# Patient Record
Sex: FEMALE | Race: ASIAN | Employment: UNEMPLOYED | URBAN - METROPOLITAN AREA
[De-identification: names, ages, dates, MRNs, and addresses within clinical notes are randomized per-mention and may not be internally consistent; named-entity substitution may affect disease eponyms.]

---

## 2018-09-26 LAB
ABO, EXTERNAL RESULT: NORMAL
HEP B, EXTERNAL RESULT: NORMAL
HIV, EXTERNAL RESULT: NORMAL
RHOGAM, EXTERNAL RESULT: POSITIVE
RPR, EXTERNAL RESULT: NORMAL
RUBELLA TITER, EXTERNAL RESULT: NORMAL

## 2018-11-18 ENCOUNTER — HOSPITAL ENCOUNTER (EMERGENCY)
Age: 23
Discharge: HOME | End: 2018-11-18
Attending: EMERGENCY MEDICINE
Payer: MEDICAID

## 2018-11-18 ENCOUNTER — APPOINTMENT (OUTPATIENT)
Dept: ULTRASOUND IMAGING | Age: 23
End: 2018-11-18
Payer: MEDICAID

## 2018-11-18 VITALS
HEIGHT: 66 IN | RESPIRATION RATE: 14 BRPM | BODY MASS INDEX: 21.86 KG/M2 | SYSTOLIC BLOOD PRESSURE: 105 MMHG | TEMPERATURE: 97.6 F | WEIGHT: 136 LBS | DIASTOLIC BLOOD PRESSURE: 67 MMHG | HEART RATE: 82 BPM | OXYGEN SATURATION: 100 %

## 2018-11-18 DIAGNOSIS — O44.40 LOW-LYING PLACENTA: ICD-10-CM

## 2018-11-18 DIAGNOSIS — N30.00 ACUTE CYSTITIS WITHOUT HEMATURIA: ICD-10-CM

## 2018-11-18 DIAGNOSIS — R10.9 ABDOMINAL PAIN DURING PREGNANCY, SECOND TRIMESTER: Primary | ICD-10-CM

## 2018-11-18 DIAGNOSIS — O26.892 ABDOMINAL PAIN DURING PREGNANCY, SECOND TRIMESTER: Primary | ICD-10-CM

## 2018-11-18 LAB
ANION GAP SERPL CALCULATED.3IONS-SCNC: 10 MMOL/L (ref 3–16)
BACTERIA: ABNORMAL /HPF
BASOPHILS ABSOLUTE: 0 K/UL (ref 0–0.2)
BASOPHILS RELATIVE PERCENT: 0.3 %
BILIRUBIN URINE: NEGATIVE
BLOOD, URINE: NEGATIVE
BUN BLDV-MCNC: 4 MG/DL (ref 7–20)
CALCIUM SERPL-MCNC: 8.9 MG/DL (ref 8.3–10.6)
CHLORIDE BLD-SCNC: 104 MMOL/L (ref 99–110)
CLARITY: ABNORMAL
CO2: 22 MMOL/L (ref 21–32)
COLOR: YELLOW
COMMENT UA: ABNORMAL
CREAT SERPL-MCNC: <0.5 MG/DL (ref 0.6–1.1)
EOSINOPHILS ABSOLUTE: 0.1 K/UL (ref 0–0.6)
EOSINOPHILS RELATIVE PERCENT: 1.7 %
EPITHELIAL CELLS, UA: 8 /HPF (ref 0–5)
GFR AFRICAN AMERICAN: >60
GFR NON-AFRICAN AMERICAN: >60
GLUCOSE BLD-MCNC: 93 MG/DL (ref 70–99)
GLUCOSE URINE: NEGATIVE MG/DL
GONADOTROPIN, CHORIONIC (HCG) QUANT: NORMAL MIU/ML
HCT VFR BLD CALC: 34.8 % (ref 36–48)
HEMOGLOBIN: 11.3 G/DL (ref 12–16)
HYALINE CASTS: 15 /LPF (ref 0–8)
KETONES, URINE: NEGATIVE MG/DL
LEUKOCYTE ESTERASE, URINE: ABNORMAL
LYMPHOCYTES ABSOLUTE: 1.5 K/UL (ref 1–5.1)
LYMPHOCYTES RELATIVE PERCENT: 20.8 %
MCH RBC QN AUTO: 26 PG (ref 26–34)
MCHC RBC AUTO-ENTMCNC: 32.4 G/DL (ref 31–36)
MCV RBC AUTO: 80.3 FL (ref 80–100)
MICROSCOPIC EXAMINATION: YES
MONOCYTES ABSOLUTE: 0.4 K/UL (ref 0–1.3)
MONOCYTES RELATIVE PERCENT: 5.7 %
NEUTROPHILS ABSOLUTE: 5.3 K/UL (ref 1.7–7.7)
NEUTROPHILS RELATIVE PERCENT: 71.5 %
NITRITE, URINE: NEGATIVE
PDW BLD-RTO: 16.4 % (ref 12.4–15.4)
PH UA: 5
PLATELET # BLD: 236 K/UL (ref 135–450)
PMV BLD AUTO: 8.4 FL (ref 5–10.5)
POTASSIUM SERPL-SCNC: 4 MMOL/L (ref 3.5–5.1)
PROTEIN UA: NEGATIVE MG/DL
RBC # BLD: 4.33 M/UL (ref 4–5.2)
RBC UA: 9 /HPF (ref 0–4)
SODIUM BLD-SCNC: 136 MMOL/L (ref 136–145)
SPECIFIC GRAVITY UA: 1.02
URINE REFLEX TO CULTURE: YES
URINE TYPE: ABNORMAL
UROBILINOGEN, URINE: 0.2 E.U./DL
WBC # BLD: 7.4 K/UL (ref 4–11)
WBC UA: 10 /HPF (ref 0–5)

## 2018-11-18 PROCEDURE — 81001 URINALYSIS AUTO W/SCOPE: CPT

## 2018-11-18 PROCEDURE — 87086 URINE CULTURE/COLONY COUNT: CPT

## 2018-11-18 PROCEDURE — 76801 OB US < 14 WKS SINGLE FETUS: CPT

## 2018-11-18 PROCEDURE — 6370000000 HC RX 637 (ALT 250 FOR IP): Performed by: EMERGENCY MEDICINE

## 2018-11-18 PROCEDURE — 99284 EMERGENCY DEPT VISIT MOD MDM: CPT

## 2018-11-18 PROCEDURE — 85025 COMPLETE CBC W/AUTO DIFF WBC: CPT

## 2018-11-18 PROCEDURE — 80048 BASIC METABOLIC PNL TOTAL CA: CPT

## 2018-11-18 PROCEDURE — 84702 CHORIONIC GONADOTROPIN TEST: CPT

## 2018-11-18 RX ORDER — ACETAMINOPHEN 500 MG
1000 TABLET ORAL ONCE
Status: COMPLETED | OUTPATIENT
Start: 2018-11-18 | End: 2018-11-18

## 2018-11-18 RX ORDER — NITROFURANTOIN 25; 75 MG/1; MG/1
100 CAPSULE ORAL 2 TIMES DAILY
Qty: 14 CAPSULE | Refills: 0 | Status: SHIPPED | OUTPATIENT
Start: 2018-11-18 | End: 2018-11-25

## 2018-11-18 RX ADMIN — ACETAMINOPHEN 1000 MG: 500 TABLET, FILM COATED ORAL at 13:48

## 2018-11-18 ASSESSMENT — PAIN DESCRIPTION - PAIN TYPE: TYPE: ACUTE PAIN

## 2018-11-18 ASSESSMENT — PAIN DESCRIPTION - LOCATION: LOCATION: ABDOMEN

## 2018-11-18 ASSESSMENT — PAIN SCALES - GENERAL: PAINLEVEL_OUTOF10: 5

## 2018-11-18 NOTE — ED PROVIDER NOTES
[facility] Emergency Department    CHIEF COMPLAINT  Chief Complaint   Patient presents with    Abdominal Pain     Pt  - states she is 14 weeks preganant - started having sharp abd pain 3 days ago - first intermittant, now constant as of today, no bleeding, points to low pelvic area      HISTORY OF PRESENT ILLNESS  Suzi Mcgee is a 23 y.o. female  who presents to the ED complaining of  at 14wk4d by dates and known IUP following with Dr. Jean Rojo. Has had several days of LLQ abd pain going to RLQ periodically. No VB or dysuria or vaginal discharge. Pain is not in the upper abd. It is colicky. NO vomiting or diarrhea. No back pain. Otherwise uncomplicated prenatal course so far. No CP cough or SOB. No other complaints, modifying factors or associated symptoms. I have reviewed the following from the nursing documentation. History reviewed. No pertinent past medical history. History reviewed. No pertinent surgical history. History reviewed. No pertinent family history. Social History     Social History    Marital status: Single     Spouse name: N/A    Number of children: N/A    Years of education: N/A     Occupational History    Not on file. Social History Main Topics    Smoking status: Never Smoker    Smokeless tobacco: Never Used    Alcohol use No    Drug use: No    Sexual activity: Not on file     Other Topics Concern    Not on file     Social History Narrative    No narrative on file     No current facility-administered medications for this encounter. Current Outpatient Prescriptions   Medication Sig Dispense Refill    nitrofurantoin, macrocrystal-monohydrate, (MACROBID) 100 MG capsule Take 1 capsule by mouth 2 times daily for 7 days 14 capsule 0     No Known Allergies    REVIEW OF SYSTEMS  10 systems reviewed, pertinent positives per HPI otherwise noted to be negative.     PHYSICAL EXAM  /66   Pulse 121   Temp 97.4 °F (36.3 °C) (Oral) Resp 18   Ht 5' 6\" (1.676 m)   Wt 136 lb (61.7 kg)   SpO2 100%   BMI 21.95 kg/m²    GENERAL APPEARANCE: Awake and alert. Cooperative. No distress. HENT: Normocephalic. Atraumatic. Mucous membranes are dry. NECK: Supple. EYES: PERRL. EOM's grossly intact. HEART/CHEST: RRR. No murmurs. No chest wall tenderness. LUNGS: Respirations unlabored. CTAB. Good air exchange. Speaking comfortably in full sentences. ABDOMEN: Mild suprapubic ttp and LLQ ttp without RLQ or any upper abdominal tenderness. Soft. Non-distended. No masses. No organomegaly. No guarding or rebound. Normal bowel sounds throughout. MUSCULOSKELETAL: No extremity edema. Compartments soft. No deformity. No tenderness in the extremities. All extremities neurovascularly intact. SKIN: Warm and dry. No acute rashes. NEUROLOGICAL: Alert and oriented. CN's 2-12 intact. No gross facial drooping. Strength 5/5, sensation intact. 2 plus DTR's in knees bilaterally. Gait normal.  PSYCHIATRIC: Normal mood and affect. LABS  I have reviewed all labs for this visit.    Results for orders placed or performed during the hospital encounter of 11/18/18   Urine, reflex to culture   Result Value Ref Range    Color, UA YELLOW Straw/Yellow    Clarity, UA CLOUDY (A) Clear    Glucose, Ur Negative Negative mg/dL    Bilirubin Urine Negative Negative    Ketones, Urine Negative Negative mg/dL    Specific Gravity, UA 1.020 1.005 - 1.030    Blood, Urine Negative Negative    pH, UA 5.0 5.0 - 8.0    Protein, UA Negative Negative mg/dL    Urobilinogen, Urine 0.2 <2.0 E.U./dL    Nitrite, Urine Negative Negative    Leukocyte Esterase, Urine SMALL (A) Negative    Microscopic Examination YES     Urine Reflex to Culture Yes     Urine Type Not Specified    CBC auto differential   Result Value Ref Range    WBC 7.4 4.0 - 11.0 K/uL    RBC 4.33 4.00 - 5.20 M/uL    Hemoglobin 11.3 (L) 12.0 - 16.0 g/dL    Hematocrit 34.8 (L) 36.0 - 48.0 %    MCV 80.3 80.0 - 100.0 fL    MCH 26.0

## 2018-11-19 LAB — URINE CULTURE, ROUTINE: NORMAL

## 2019-01-30 LAB
GLUCOSE CHALLENGE: 87 MG/DL
HCT VFR BLD CALC: 32 % (ref 36–48)
HEMOGLOBIN: 10.5 G/DL (ref 12–16)
MCH RBC QN AUTO: 27.4 PG (ref 26–34)
MCHC RBC AUTO-ENTMCNC: 32.8 G/DL (ref 31–36)
MCV RBC AUTO: 83.5 FL (ref 80–100)
PDW BLD-RTO: 15.1 % (ref 12.4–15.4)
PLATELET # BLD: 235 K/UL (ref 135–450)
PMV BLD AUTO: 8.9 FL (ref 5–10.5)
RBC # BLD: 3.83 M/UL (ref 4–5.2)
WBC # BLD: 10.5 K/UL (ref 4–11)

## 2019-03-26 ENCOUNTER — HOSPITAL ENCOUNTER (OUTPATIENT)
Age: 24
Discharge: HOME | End: 2019-03-26
Attending: OBSTETRICS & GYNECOLOGY | Admitting: OBSTETRICS & GYNECOLOGY
Payer: MEDICAID

## 2019-03-26 VITALS
RESPIRATION RATE: 18 BRPM | HEART RATE: 100 BPM | SYSTOLIC BLOOD PRESSURE: 107 MMHG | DIASTOLIC BLOOD PRESSURE: 55 MMHG | WEIGHT: 150 LBS | BODY MASS INDEX: 24.11 KG/M2 | HEIGHT: 66 IN

## 2019-03-26 LAB
BILIRUBIN URINE: NEGATIVE
BLOOD, URINE: NEGATIVE
CLARITY: CLEAR
COLOR: YELLOW
GLUCOSE URINE: NEGATIVE MG/DL
KETONES, URINE: NEGATIVE MG/DL
LEUKOCYTE ESTERASE, URINE: NEGATIVE
MICROSCOPIC EXAMINATION: NORMAL
NITRITE, URINE: NEGATIVE
PH UA: 7 (ref 5–8)
PROTEIN UA: NEGATIVE MG/DL
SPECIFIC GRAVITY UA: 1.01 (ref 1–1.03)
URINE TYPE: NORMAL
UROBILINOGEN, URINE: 0.2 E.U./DL

## 2019-03-26 PROCEDURE — 99211 OFF/OP EST MAY X REQ PHY/QHP: CPT

## 2019-03-26 PROCEDURE — 59025 FETAL NON-STRESS TEST: CPT

## 2019-03-26 PROCEDURE — 81003 URINALYSIS AUTO W/O SCOPE: CPT

## 2019-05-04 ENCOUNTER — HOSPITAL ENCOUNTER (OUTPATIENT)
Age: 24
Discharge: HOME | End: 2019-05-04
Attending: OBSTETRICS & GYNECOLOGY | Admitting: OBSTETRICS & GYNECOLOGY
Payer: MEDICAID

## 2019-05-04 VITALS
BODY MASS INDEX: 25.02 KG/M2 | SYSTOLIC BLOOD PRESSURE: 111 MMHG | TEMPERATURE: 96.2 F | DIASTOLIC BLOOD PRESSURE: 70 MMHG | WEIGHT: 155 LBS | HEART RATE: 76 BPM | RESPIRATION RATE: 16 BRPM

## 2019-05-04 PROCEDURE — 99211 OFF/OP EST MAY X REQ PHY/QHP: CPT

## 2019-05-04 PROCEDURE — 59025 FETAL NON-STRESS TEST: CPT

## 2019-05-04 RX ORDER — FERROUS SULFATE 325(65) MG
325 TABLET ORAL
Status: ON HOLD | COMMUNITY
End: 2019-05-13

## 2019-05-05 NOTE — PROCEDURES
FETAL SURVEILLANCE TESTING SUMMARY    INDICATIONS:  rule out uterine contractions    OBJECTIVE RESULTS:  Fetal heart variability: moderate  Fetal Heart Rate accelerations: yes  Baseline FHR: 130's per minute  Fetal Non-stress Test: reactive  Uterine contractions: regular, every 5 minutes    Fetal surveillance: reassuring

## 2019-05-05 NOTE — PROGRESS NOTES
Department of Obstetrics and Gynecology  Labor and Delivery Triage Note        CHIEF COMPLAINT:  contractions    HISTORY OF PRESENT ILLNESS:      The patient is a 20 y.o. female 44w7d. OB History        1    Para        Term                AB        Living           SAB        TAB        Ectopic        Molar        Multiple        Live Births                  Patient presents with a chief complaint as above. Estimated Due Date:  Estimated Date of Delivery: 19    PAST MEDICAL HISTORY: History reviewed. No pertinent past medical history. PAST  SURGICAL HISTORY: History reviewed. No pertinent surgical history. SOCIAL HISTORY:     reports that she has never smoked. She has never used smokeless tobacco. She reports that she does not drink alcohol or use drugs. MEDICATIONS:    Prior to Admission medications    Medication Sig Start Date End Date Taking? Authorizing Provider   ferrous sulfate 325 (65 Fe) MG tablet Take 325 mg by mouth daily (with breakfast)   Yes Historical Provider, MD   Prenatal Multivit-Min-Fe-FA (PRENATAL 1 + IRON PO) Take by mouth   Yes Historical Provider, MD        PRENATAL CARE:    Complicated by: none    REVIEW OF SYSTEMS:     Pertinent items are noted in HPI. PHYSICAL EXAM:    Vital Signs: Blood pressure 111/70, pulse 76, temperature 96.2 °F (35.7 °C), temperature source Temporal, resp. rate 16, weight 155 lb (70.3 kg). Abdomen soft, non tender, consistent with her EGA. Fetal heart rate:  Baseline Heart Rate 130's, accelerations:  present  long term variability:  moderate  Cervix:  Closed Long firm     Contraction frequency:  5 minutes    Membranes:  Intact    General Labs:      Nond3    ASSESSMENT:    38w5d. There is no problem list on file for this patient.          PLAN:  Disposition:  Patient discharged home and instructed on symptoms of labor, rupture of membranes, vaginal bleeding, fetal movement and fever  F/U Instructions: as

## 2019-05-12 ENCOUNTER — HOSPITAL ENCOUNTER (INPATIENT)
Age: 24
LOS: 8 days | Discharge: HOME | DRG: 560 | End: 2019-05-21
Attending: OBSTETRICS & GYNECOLOGY | Admitting: OBSTETRICS & GYNECOLOGY
Payer: MEDICAID

## 2019-05-12 PROBLEM — O47.9 THREATENED LABOR: Status: ACTIVE | Noted: 2019-05-12

## 2019-05-13 ENCOUNTER — ANESTHESIA EVENT (OUTPATIENT)
Dept: LABOR AND DELIVERY | Age: 24
DRG: 560 | End: 2019-05-13
Payer: MEDICAID

## 2019-05-13 ENCOUNTER — ANESTHESIA (OUTPATIENT)
Dept: LABOR AND DELIVERY | Age: 24
DRG: 560 | End: 2019-05-13
Payer: MEDICAID

## 2019-05-13 PROBLEM — O47.9 THREATENED LABOR AT TERM: Status: ACTIVE | Noted: 2019-05-13

## 2019-05-13 LAB
AMPHETAMINE SCREEN, URINE: NORMAL
BARBITURATE SCREEN URINE: NORMAL
BENZODIAZEPINE SCREEN, URINE: NORMAL
BUPRENORPHINE URINE: NORMAL
CANNABINOID SCREEN URINE: NORMAL
COCAINE METABOLITE SCREEN URINE: NORMAL
HCT VFR BLD CALC: 35.5 % (ref 36–48)
HEMOGLOBIN: 11.6 G/DL (ref 12–16)
Lab: NORMAL
MCH RBC QN AUTO: 26.8 PG (ref 26–34)
MCHC RBC AUTO-ENTMCNC: 32.6 G/DL (ref 31–36)
MCV RBC AUTO: 82.1 FL (ref 80–100)
METHADONE SCREEN, URINE: NORMAL
OPIATE SCREEN URINE: NORMAL
OXYCODONE URINE: NORMAL
PDW BLD-RTO: 14.9 % (ref 12.4–15.4)
PH UA: 5.5
PHENCYCLIDINE SCREEN URINE: NORMAL
PLATELET # BLD: 229 K/UL (ref 135–450)
PMV BLD AUTO: 8.8 FL (ref 5–10.5)
PROPOXYPHENE SCREEN: NORMAL
RBC # BLD: 4.33 M/UL (ref 4–5.2)
SPECIMEN STATUS: NORMAL
TOTAL SYPHILLIS IGG/IGM: NORMAL
WBC # BLD: 9.9 K/UL (ref 4–11)

## 2019-05-13 PROCEDURE — 51702 INSERT TEMP BLADDER CATH: CPT

## 2019-05-13 PROCEDURE — 80307 DRUG TEST PRSMV CHEM ANLYZR: CPT

## 2019-05-13 PROCEDURE — 85027 COMPLETE CBC AUTOMATED: CPT

## 2019-05-13 PROCEDURE — 6360000002 HC RX W HCPCS: Performed by: OBSTETRICS & GYNECOLOGY

## 2019-05-13 PROCEDURE — 86780 TREPONEMA PALLIDUM: CPT

## 2019-05-13 PROCEDURE — 1220000000 HC SEMI PRIVATE OB R&B

## 2019-05-13 PROCEDURE — 2500000003 HC RX 250 WO HCPCS: Performed by: NURSE ANESTHETIST, CERTIFIED REGISTERED

## 2019-05-13 PROCEDURE — 2580000003 HC RX 258: Performed by: OBSTETRICS & GYNECOLOGY

## 2019-05-13 PROCEDURE — 3700000025 EPIDURAL BLOCK: Performed by: ANESTHESIOLOGY

## 2019-05-13 RX ORDER — SODIUM CHLORIDE, SODIUM LACTATE, POTASSIUM CHLORIDE, CALCIUM CHLORIDE 600; 310; 30; 20 MG/100ML; MG/100ML; MG/100ML; MG/100ML
INJECTION, SOLUTION INTRAVENOUS CONTINUOUS
Status: DISCONTINUED | OUTPATIENT
Start: 2019-05-13 | End: 2019-05-14

## 2019-05-13 RX ORDER — ONDANSETRON 2 MG/ML
4 INJECTION INTRAMUSCULAR; INTRAVENOUS EVERY 6 HOURS PRN
Status: DISCONTINUED | OUTPATIENT
Start: 2019-05-13 | End: 2019-05-14

## 2019-05-13 RX ORDER — LIDOCAINE HYDROCHLORIDE 15 MG/ML
INJECTION, SOLUTION EPIDURAL; INFILTRATION; INTRACAUDAL; PERINEURAL PRN
Status: DISCONTINUED | OUTPATIENT
Start: 2019-05-13 | End: 2019-05-14 | Stop reason: SDUPTHER

## 2019-05-13 RX ORDER — IRON POLYSACCHARIDE COMPLEX 180 MG
180 CAPSULE ORAL DAILY
Refills: 4 | Status: ON HOLD | COMMUNITY
Start: 2019-05-07 | End: 2019-05-14 | Stop reason: HOSPADM

## 2019-05-13 RX ORDER — DOCUSATE SODIUM 100 MG/1
100 CAPSULE, LIQUID FILLED ORAL 2 TIMES DAILY
Status: DISCONTINUED | OUTPATIENT
Start: 2019-05-13 | End: 2019-05-14

## 2019-05-13 RX ORDER — BUTORPHANOL TARTRATE 1 MG/ML
1 INJECTION, SOLUTION INTRAMUSCULAR; INTRAVENOUS ONCE
Status: DISCONTINUED | OUTPATIENT
Start: 2019-05-13 | End: 2019-05-13

## 2019-05-13 RX ORDER — BUTORPHANOL TARTRATE 1 MG/ML
1 INJECTION, SOLUTION INTRAMUSCULAR; INTRAVENOUS
Status: COMPLETED | OUTPATIENT
Start: 2019-05-13 | End: 2019-05-13

## 2019-05-13 RX ORDER — ACETAMINOPHEN 325 MG/1
650 TABLET ORAL EVERY 4 HOURS PRN
Status: DISCONTINUED | OUTPATIENT
Start: 2019-05-13 | End: 2019-05-14

## 2019-05-13 RX ADMIN — SODIUM CHLORIDE, POTASSIUM CHLORIDE, SODIUM LACTATE AND CALCIUM CHLORIDE: 600; 310; 30; 20 INJECTION, SOLUTION INTRAVENOUS at 07:26

## 2019-05-13 RX ADMIN — BUTORPHANOL TARTRATE 1 MG: 1 INJECTION, SOLUTION INTRAMUSCULAR; INTRAVENOUS at 14:41

## 2019-05-13 RX ADMIN — SODIUM CHLORIDE, POTASSIUM CHLORIDE, SODIUM LACTATE AND CALCIUM CHLORIDE: 600; 310; 30; 20 INJECTION, SOLUTION INTRAVENOUS at 02:33

## 2019-05-13 RX ADMIN — SODIUM CHLORIDE, POTASSIUM CHLORIDE, SODIUM LACTATE AND CALCIUM CHLORIDE: 600; 310; 30; 20 INJECTION, SOLUTION INTRAVENOUS at 18:08

## 2019-05-13 RX ADMIN — Medication 1 MILLI-UNITS/MIN: at 15:01

## 2019-05-13 RX ADMIN — SODIUM CHLORIDE, POTASSIUM CHLORIDE, SODIUM LACTATE AND CALCIUM CHLORIDE: 600; 310; 30; 20 INJECTION, SOLUTION INTRAVENOUS at 23:45

## 2019-05-13 RX ADMIN — Medication 12 ML/HR: at 18:09

## 2019-05-13 RX ADMIN — LIDOCAINE HYDROCHLORIDE 3 ML: 15 INJECTION, SOLUTION EPIDURAL; INFILTRATION; INTRACAUDAL; PERINEURAL at 18:19

## 2019-05-13 RX ADMIN — ONDANSETRON 4 MG: 2 INJECTION INTRAMUSCULAR; INTRAVENOUS at 19:23

## 2019-05-13 RX ADMIN — SODIUM CHLORIDE, POTASSIUM CHLORIDE, SODIUM LACTATE AND CALCIUM CHLORIDE: 600; 310; 30; 20 INJECTION, SOLUTION INTRAVENOUS at 14:36

## 2019-05-13 ASSESSMENT — PAIN DESCRIPTION - DESCRIPTORS: DESCRIPTORS: CRAMPING;DISCOMFORT

## 2019-05-13 ASSESSMENT — PAIN SCALES - GENERAL
PAINLEVEL_OUTOF10: 4
PAINLEVEL_OUTOF10: 10

## 2019-05-13 NOTE — PLAN OF CARE
RN  Outcome: Ongoing  5/13/2019 0702 by Willa Vera RN  Outcome: Met This Shift     Problem: Pain - Acute:  Goal: Pain level will decrease  Description  Pain level will decrease  5/13/2019 1045 by Manjula Ji RN  Outcome: Ongoing  5/13/2019 0702 by Willa Vera RN  Outcome: Ongoing  Note:   Pt desires non medicated laboring at this time  Goal: Able to cope with pain  Description  Able to cope with pain  5/13/2019 1045 by Manjula Ji RN  Outcome: Ongoing  5/13/2019 0702 by Wilal Vera RN  Outcome: Met This Shift     Problem: Tissue Perfusion - Uteroplacental, Altered:  Description  [TRUNCATED] For intrapartum patients with recurrent variable decelerations of the fetal heart rate, consider transcervical amnioinfusion. For patients in labor, avoid prophylactic use of continuous maternal oxygen supplementation to prevent nonreassu . .. Goal: Absence of abnormal fetal heart rate pattern  Description  Absence of abnormal fetal heart rate pattern  5/13/2019 1045 by Manjula Ji RN  Outcome: Ongoing  5/13/2019 0702 by Willa Vera RN  Outcome: Met This Shift     Problem: Urinary Retention:  Goal: Experiences of bladder distention will decrease  Description  Experiences of bladder distention will decrease  5/13/2019 1045 by Manjula Ji RN  Outcome: Ongoing  5/13/2019 0702 by Willa Vera RN  Outcome: Met This Shift  Goal: Urinary elimination within specified parameters  Description  Urinary elimination within specified parameters  5/13/2019 1045 by Manjula Ji RN  Outcome: Ongoing  5/13/2019 0702 by Willa Vera RN  Outcome: Met This Shift     Problem: Pain:  Description  Pain management should include both nonpharmacologic and pharmacologic interventions.   Goal: Pain level will decrease  Description  Pain level will decrease  5/13/2019 1045 by Manjula Ji RN  Outcome: Ongoing  5/13/2019 0702 by Willa Vera RN  Outcome: Ongoing  Note:   Pt desires non medicated laboring at this time  Goal: Control of acute pain  Description  Control of acute pain  Outcome: Ongoing

## 2019-05-13 NOTE — FLOWSHEET NOTE
SVE with slight cervical change. Pt states desires NCB. Pt off of monitors at this time to ambulate halls. Pt advised is to return to triage room by 0030 for further monitoring. MD made aware of pt status. No further orders at this time.

## 2019-05-13 NOTE — ANESTHESIA PRE PROCEDURE
Department of Anesthesiology  Preprocedure Note       Name:  Rosalind Salcedo   Age:  20 y.o.  :  1995                                          MRN:  [id number]         Date:  2019      Surgeon: * No surgeons listed *    Procedure: Labor Analgesia    Medications prior to admission:   Prior to Admission medications    Medication Sig Start Date End Date Taking? Authorizing Provider   KYLE 391.3 (180 Fe) MG CAPS Take 180 mg by mouth daily 19   Historical Provider, MD   Prenatal Multivit-Min-Fe-FA (PRENATAL 1 + IRON PO) Take by mouth    Historical Provider, MD       Current medications:    Current Facility-Administered Medications   Medication Dose Route Frequency Provider Last Rate Last Dose    lactated ringers infusion   Intravenous Continuous Bakari Zhang  mL/hr at 19 0726      acetaminophen (TYLENOL) tablet 650 mg  650 mg Oral Q4H PRN Bakari Zhang MD        docusate sodium (COLACE) capsule 100 mg  100 mg Oral BID Bakari Zhang MD        ondansetron [facility]) injection 4 mg  4 mg Intravenous Q6H PRN Bakari Zhang MD        benzocaine-menthol (DERMOPLAST) 20-0.5 % spray   Topical PRN Bakari Zhang MD        oxytocin (PITOCIN) 30 units in 500 mL infusion  1 johnathan-units/min Intravenous Continuous PRN Bakari Zhang MD           Allergies:  No Known Allergies    Problem List:    Patient Active Problem List   Diagnosis Code    Threatened labor O50.10    Threatened labor at term O50.10       Past Medical History:        Diagnosis Date    Anemia     taking iron       Past Surgical History:  History reviewed. No pertinent surgical history. Social History:    Social History     Tobacco Use    Smoking status: Never Smoker    Smokeless tobacco: Never Used   Substance Use Topics    Alcohol use:  No                                Counseling given: Not Answered      Vital Signs (Current):   Vitals:    19 0831 19 0930 19 1030 19 1035   BP: 126/72 119/66

## 2019-05-13 NOTE — FLOWSHEET NOTE
Encouraged pt to ambulate and change positions often throughout labor. Pt and s.o. Verbalized understanding.

## 2019-05-13 NOTE — H&P
Department of Obstetrics and Gynecology   Obstetrics History and Physical        CHIEF COMPLAINT:  contractions    HISTORY OF PRESENT ILLNESS:      The patient is a 20 y.o. female at 37w0d. OB History        1    Para        Term                AB        Living           SAB        TAB        Ectopic        Molar        Multiple        Live Births                Patient presents with a chief complaint as above and is being admitted for active phase labor    Estimated Due Date: Estimated Date of Delivery: 19    PRENATAL CARE:    Complicated by: none    PAST OB HISTORY:  OB History        1    Para        Term                AB        Living           SAB        TAB        Ectopic        Molar        Multiple        Live Births                    Past Medical History:        Diagnosis Date    Anemia     taking iron     Past Surgical History:    History reviewed. No pertinent surgical history. Allergies:  Patient has no known allergies.     Social History:    Social History     Socioeconomic History    Marital status: Single     Spouse name: Not on file    Number of children: Not on file    Years of education: Not on file    Highest education level: Not on file   Occupational History    Not on file   Social Needs    Financial resource strain: Not on file    Food insecurity:     Worry: Not on file     Inability: Not on file    Transportation needs:     Medical: Not on file     Non-medical: Not on file   Tobacco Use    Smoking status: Never Smoker    Smokeless tobacco: Never Used   Substance and Sexual Activity    Alcohol use: No    Drug use: No    Sexual activity: Yes     Partners: Male   Lifestyle    Physical activity:     Days per week: Not on file     Minutes per session: Not on file    Stress: Not on file   Relationships    Social connections:     Talks on phone: Not on file     Gets together: Not on file     Attends Faith service: Not on file     Active member of club or organization: Not on file     Attends meetings of clubs or organizations: Not on file     Relationship status: Not on file    Intimate partner violence:     Fear of current or ex partner: Not on file     Emotionally abused: Not on file     Physically abused: Not on file     Forced sexual activity: Not on file   Other Topics Concern    Not on file   Social History Narrative    Not on file     Family History:   History reviewed. No pertinent family history. Medications Prior to Admission:  Medications Prior to Admission: PROFE 391.3 (180 Fe) MG CAPS, Take 180 mg by mouth daily  [DISCONTINUED] ferrous sulfate 325 (65 Fe) MG tablet, Take 325 mg by mouth daily (with breakfast)  Prenatal Multivit-Min-Fe-FA (PRENATAL 1 + IRON PO), Take by mouth    REVIEW OF SYSTEMS:    negative    PHYSICAL EXAM:  Vitals:    05/13/19 0930 05/13/19 1030 05/13/19 1035 05/13/19 1155   BP: 119/66  116/71 120/74   Pulse: 89  89 88   Resp: 18 18     Temp:    97.3 °F (36.3 °C)   TempSrc:    Oral   Weight:       Height:         General appearance:  awake, alert, cooperative, no apparent distress, and appears stated age  Neurologic:  Awake, alert, oriented to name, place and time. Lungs:  No increased work of breathing, good air exchange  Abdomen:  Soft, non tender, gravid, consistent with her gestational age, EFW by Leopold's maneuver was 7 lb   Fetal heart rate:  Reassuring.   Pelvis:  Adequate pelvis  Cervix: 3-4/70/-2  Contraction frequency:      Membranes:  AROM thick mec    Labs:   CBC:   Lab Results   Component Value Date    WBC 9.9 05/13/2019    RBC 4.33 05/13/2019    HGB 11.6 05/13/2019    HCT 35.5 05/13/2019    MCV 82.1 05/13/2019    MCH 26.8 05/13/2019    MCHC 32.6 05/13/2019    RDW 14.9 05/13/2019     05/13/2019    MPV 8.8 05/13/2019       ASSESSMENT AND PLAN:    Labor: Admit, anticipate normal delivery, routine labor orders  Fetus: Reassuring  GBS: No

## 2019-05-13 NOTE — ANESTHESIA PROCEDURE NOTES
Epidural Block    Patient location during procedure: OB  Start time: 5/13/2019 5:58 PM  Reason for block: labor epidural  Staffing  Anesthesiologist: Neeraj Bruno MD  Resident/CRNA: VIVIAN Escoto CRNA  Performed: resident/CRNA   Preanesthetic Checklist  Completed: patient identified, site marked, surgical consent, pre-op evaluation, timeout performed, IV checked, risks and benefits discussed, monitors and equipment checked, anesthesia consent given, oxygen available and patient being monitored  Epidural  Patient position: sitting  Prep: ChloraPrep  Patient monitoring: continuous pulse ox and frequent blood pressure checks  Approach: midline  Location: lumbar (1-5)  Injection technique: SARITA air  Provider prep: mask and sterile gloves  Needle  Needle type: Tuohy   Needle gauge: 17 G  Needle length: 3.5 in  Needle insertion depth: 5 cm  Catheter type: side hole  Catheter size: 19 G  Catheter at skin depth: 9 cm  Test dose: negative  Assessment  Sensory level: T10  Hemodynamics: stable  Attempts: 1

## 2019-05-13 NOTE — FLOWSHEET NOTE
Pt admitted to triage from home. Complains of contractions that started this evening at approximately 1800. Pt denies vaginal bleeding or ROM. Placed on EFM, triage admission complete, SVE per RN. Pt aware of triage POC, denies needs at this time. Will reassess in 1 hour.

## 2019-05-13 NOTE — FLOWSHEET NOTE
Updated Dr. Alesia Ruiz of [facility] [place] stadol given for pain and efm with ctx pattern. Orders received to start pitocin.

## 2019-05-14 PROCEDURE — 7200000001 HC VAGINAL DELIVERY

## 2019-05-14 PROCEDURE — 0KQM0ZZ REPAIR PERINEUM MUSCLE, OPEN APPROACH: ICD-10-PCS | Performed by: OBSTETRICS & GYNECOLOGY

## 2019-05-14 PROCEDURE — 6370000000 HC RX 637 (ALT 250 FOR IP): Performed by: OBSTETRICS & GYNECOLOGY

## 2019-05-14 PROCEDURE — 2500000003 HC RX 250 WO HCPCS: Performed by: NURSE ANESTHETIST, CERTIFIED REGISTERED

## 2019-05-14 PROCEDURE — 1220000000 HC SEMI PRIVATE OB R&B

## 2019-05-14 PROCEDURE — 4A1HXCZ MONITORING OF PRODUCTS OF CONCEPTION, CARDIAC RATE, EXTERNAL APPROACH: ICD-10-PCS | Performed by: OBSTETRICS & GYNECOLOGY

## 2019-05-14 RX ORDER — METHYLERGONOVINE MALEATE 0.2 MG/ML
200 INJECTION INTRAVENOUS
Status: ACTIVE | OUTPATIENT
Start: 2019-05-14 | End: 2019-05-14

## 2019-05-14 RX ORDER — FAMOTIDINE 20 MG/1
20 TABLET, FILM COATED ORAL 2 TIMES DAILY PRN
Status: DISCONTINUED | OUTPATIENT
Start: 2019-05-14 | End: 2019-05-21 | Stop reason: HOSPADM

## 2019-05-14 RX ORDER — ACETAMINOPHEN 500 MG
1000 TABLET ORAL EVERY 6 HOURS PRN
Qty: 30 TABLET | Refills: 0 | Status: SHIPPED | OUTPATIENT
Start: 2019-05-14

## 2019-05-14 RX ORDER — LANOLIN 100 %
OINTMENT (GRAM) TOPICAL PRN
Status: DISCONTINUED | OUTPATIENT
Start: 2019-05-14 | End: 2019-05-21 | Stop reason: HOSPADM

## 2019-05-14 RX ORDER — ACETAMINOPHEN 500 MG
1000 TABLET ORAL EVERY 6 HOURS PRN
Status: DISCONTINUED | OUTPATIENT
Start: 2019-05-14 | End: 2019-05-21 | Stop reason: HOSPADM

## 2019-05-14 RX ORDER — ONDANSETRON 2 MG/ML
4 INJECTION INTRAMUSCULAR; INTRAVENOUS EVERY 6 HOURS PRN
Status: DISCONTINUED | OUTPATIENT
Start: 2019-05-14 | End: 2019-05-21 | Stop reason: HOSPADM

## 2019-05-14 RX ORDER — IBUPROFEN 800 MG/1
800 TABLET ORAL EVERY 8 HOURS PRN
Qty: 30 TABLET | Refills: 0 | Status: SHIPPED | OUTPATIENT
Start: 2019-05-14

## 2019-05-14 RX ORDER — DOCUSATE SODIUM 100 MG/1
100 CAPSULE, LIQUID FILLED ORAL 2 TIMES DAILY
Status: DISCONTINUED | OUTPATIENT
Start: 2019-05-14 | End: 2019-05-21 | Stop reason: HOSPADM

## 2019-05-14 RX ORDER — SENNA AND DOCUSATE SODIUM 50; 8.6 MG/1; MG/1
1 TABLET, FILM COATED ORAL DAILY PRN
Status: DISCONTINUED | OUTPATIENT
Start: 2019-05-14 | End: 2019-05-21 | Stop reason: HOSPADM

## 2019-05-14 RX ORDER — OXYCODONE HYDROCHLORIDE 5 MG/1
5 TABLET ORAL EVERY 6 HOURS PRN
Qty: 12 TABLET | Refills: 0 | Status: SHIPPED | OUTPATIENT
Start: 2019-05-14 | End: 2019-05-17

## 2019-05-14 RX ORDER — ONDANSETRON 4 MG/1
4 TABLET, ORALLY DISINTEGRATING ORAL EVERY 6 HOURS PRN
Status: DISCONTINUED | OUTPATIENT
Start: 2019-05-14 | End: 2019-05-21 | Stop reason: HOSPADM

## 2019-05-14 RX ORDER — OXYCODONE HYDROCHLORIDE 5 MG/1
5 TABLET ORAL EVERY 4 HOURS PRN
Status: DISCONTINUED | OUTPATIENT
Start: 2019-05-14 | End: 2019-05-21 | Stop reason: HOSPADM

## 2019-05-14 RX ORDER — SODIUM CHLORIDE 0.9 % (FLUSH) 0.9 %
10 SYRINGE (ML) INJECTION PRN
Status: DISCONTINUED | OUTPATIENT
Start: 2019-05-14 | End: 2019-05-21 | Stop reason: HOSPADM

## 2019-05-14 RX ORDER — IBUPROFEN 800 MG/1
800 TABLET ORAL EVERY 8 HOURS PRN
Status: DISCONTINUED | OUTPATIENT
Start: 2019-05-14 | End: 2019-05-21 | Stop reason: HOSPADM

## 2019-05-14 RX ORDER — MISOPROSTOL 100 UG/1
800 TABLET ORAL PRN
Status: DISCONTINUED | OUTPATIENT
Start: 2019-05-14 | End: 2019-05-21 | Stop reason: HOSPADM

## 2019-05-14 RX ORDER — FERROUS SULFATE 325(65) MG
325 TABLET ORAL DAILY
Status: DISCONTINUED | OUTPATIENT
Start: 2019-05-14 | End: 2019-05-21 | Stop reason: HOSPADM

## 2019-05-14 RX ORDER — SODIUM CHLORIDE 0.9 % (FLUSH) 0.9 %
10 SYRINGE (ML) INJECTION EVERY 12 HOURS SCHEDULED
Status: DISCONTINUED | OUTPATIENT
Start: 2019-05-14 | End: 2019-05-21 | Stop reason: HOSPADM

## 2019-05-14 RX ORDER — SIMETHICONE 80 MG
80 TABLET,CHEWABLE ORAL EVERY 6 HOURS PRN
Status: DISCONTINUED | OUTPATIENT
Start: 2019-05-14 | End: 2019-05-21 | Stop reason: HOSPADM

## 2019-05-14 RX ORDER — CARBOPROST TROMETHAMINE 250 UG/ML
250 INJECTION, SOLUTION INTRAMUSCULAR
Status: ACTIVE | OUTPATIENT
Start: 2019-05-14 | End: 2019-05-14

## 2019-05-14 RX ADMIN — IBUPROFEN 800 MG: 800 TABLET, FILM COATED ORAL at 06:31

## 2019-05-14 RX ADMIN — DOCUSATE SODIUM 100 MG: 100 CAPSULE, LIQUID FILLED ORAL at 20:30

## 2019-05-14 RX ADMIN — LIDOCAINE HYDROCHLORIDE 5 ML: 15 INJECTION, SOLUTION EPIDURAL; INFILTRATION; INTRACAUDAL; PERINEURAL at 01:05

## 2019-05-14 RX ADMIN — ACETAMINOPHEN 1000 MG: 500 TABLET, FILM COATED ORAL at 20:30

## 2019-05-14 RX ADMIN — IBUPROFEN 800 MG: 800 TABLET, FILM COATED ORAL at 16:52

## 2019-05-14 ASSESSMENT — PAIN SCALES - GENERAL
PAINLEVEL_OUTOF10: 4
PAINLEVEL_OUTOF10: 5
PAINLEVEL_OUTOF10: 4

## 2019-05-14 NOTE — PROGRESS NOTES
[facility] post-partum note    Post-partum Day #0    Subjective:   Pain is : Mild  Lochia: thin lochia  Breastfeeding: plans to breastfeed    Objective:  Patient Vitals for the past 8 hrs:   BP Temp Temp src Pulse Resp   05/14/19 1030 (!) 96/54 97.9 °F (36.6 °C) Oral 98 16   05/14/19 0619 (!) 105/58 99.7 °F (37.6 °C) Oral 150 20   05/14/19 0558 123/62 -- -- 118 18   05/14/19 0543 123/66 -- -- 117 18   05/14/19 0528 124/62 -- -- 102 16   05/14/19 0513 -- -- -- -- 18   05/14/19 0458 -- -- -- -- 16   05/14/19 0443 130/62 -- -- 109 18   05/14/19 0428 131/62 -- -- 121 18   05/14/19 0413 123/60 -- -- 122 20     Abd: soft, non tender  Uterus: firm, non tender  Ext: no edema, calves non tender    Lab Results   Component Value Date    WBC 9.9 05/13/2019    HGB 11.6 (L) 05/13/2019    HCT 35.5 (L) 05/13/2019    MCV 82.1 05/13/2019     05/13/2019            Assessment/Plan:      Continue Postpartum care  Discharge today: no  Follow up: 6 weeks

## 2019-05-14 NOTE — PROGRESS NOTES
Bedside handoff completed. All questions answered. Orders reviewed. Patient included in discussion regarding plan of care. Pt has no needs at this time. Pt encouraged to call RN for any assistance needed throughout the shift. RN name and # on whiteboard. Call light is within reach.  Will continue to monitor

## 2019-05-14 NOTE — PROGRESS NOTES
S: Pt without complaints  O: BP (!) 98/56   Pulse 120   Temp 98.9 °F (37.2 °C) (Oral)   Resp 16   Ht 5' 6\" (1.676 m)   Wt 158 lb (71.7 kg)   SpO2 96%   BMI 25.50 kg/m²         Fht - 150's, moderate variability       Cx - 6/90/-2       Ctx - q 3 min  A/P: 40 wks       Continue pitocin

## 2019-05-14 NOTE — DISCHARGE SUMMARY
Obstetrical Discharge Form    Gestational Age: 44w3d    Antepartum complications: none    Date of Delivery: 2019      Type of Delivery: vaginal, spontaneous    Delivered By: Fuad Piedra     Baby:      Information for the patient's :  Rivera Srivastava [[id number]]   APGAR One: 9    Information for the patient's :  Rivera Srivastava [[id number]]   APGAR Five: 9    Information for the patient's :  Rivera Srivastava [[id number]]   Birth Weight: 6 lb 11.2 oz (3.04 kg)      Anesthesia: Epidural    Intrapartum complications: None    Postpartum complications: {HX PREGNANCY COMPLICATIONS BFFSJ:186151}    Discharge Medication:    Radha Shape   Home Medication Instructions [id number]    Printed on:19 0403   Medication Information                      acetaminophen (APAP EXTRA STRENGTH) 500 MG tablet  Take 2 tablets by mouth every 6 hours as needed for Pain             ibuprofen (ADVIL;MOTRIN) 800 MG tablet  Take 1 tablet by mouth every 8 hours as needed for Pain             oxyCODONE (ROXICODONE) 5 MG immediate release tablet  Take 1 tablet by mouth every 6 hours as needed for Pain for up to 3 days. Intended supply: 3 days. Take lowest dose possible to manage pain                  Discharge Condition:  {condition:41613}    Discharge Date: ***    PLAN:  Follow up in 6 weeks for routine PP visit  All questions answered  D/C summary begun at delivery for D/C planning purposes, any delay in discharge from ordered D/C date due to  factors.

## 2019-05-15 PROCEDURE — 6370000000 HC RX 637 (ALT 250 FOR IP): Performed by: OBSTETRICS & GYNECOLOGY

## 2019-05-15 PROCEDURE — 1220000000 HC SEMI PRIVATE OB R&B

## 2019-05-15 RX ADMIN — IBUPROFEN 800 MG: 800 TABLET, FILM COATED ORAL at 06:06

## 2019-05-15 RX ADMIN — DOCUSATE SODIUM 100 MG: 100 CAPSULE, LIQUID FILLED ORAL at 21:16

## 2019-05-15 RX ADMIN — DOCUSATE SODIUM 100 MG: 100 CAPSULE, LIQUID FILLED ORAL at 09:13

## 2019-05-15 RX ADMIN — ACETAMINOPHEN 1000 MG: 500 TABLET, FILM COATED ORAL at 06:06

## 2019-05-15 RX ADMIN — ACETAMINOPHEN 1000 MG: 500 TABLET, FILM COATED ORAL at 16:32

## 2019-05-15 RX ADMIN — FERROUS SULFATE TAB 325 MG (65 MG ELEMENTAL FE) 325 MG: 325 (65 FE) TAB at 09:13

## 2019-05-15 RX ADMIN — IBUPROFEN 800 MG: 800 TABLET, FILM COATED ORAL at 16:32

## 2019-05-15 ASSESSMENT — PAIN SCALES - GENERAL
PAINLEVEL_OUTOF10: 5
PAINLEVEL_OUTOF10: 4

## 2019-05-15 NOTE — PROGRESS NOTES
[facility] post-partum note    Post-partum Day #1    Subjective:    Had a fever overnight. No localizing sx.   ROS: no pain at epidural, no nipple pain, IV site ok, no uterine pain, no dysuria, no calf pain, no cough or URI sx  Lochia: Normal    Objective:  Vitals:    05/15/19 0515 05/15/19 0645 05/15/19 1028 05/15/19 1043   BP:    118/71   Pulse:    81   Resp:    16   Temp: 99.3 °F (37.4 °C) 100.3 °F (37.9 °C) 98.3 °F (36.8 °C) 96.9 °F (36.1 °C)   TempSrc:  Oral Oral Oral   SpO2:       Weight:       Height:           Physical Examination:  Appears well  Back: epidural site normal  IV site normal  Uterus: Firm, NT  Calves: NT    Labs:    Recent Labs     05/13/19  0215   WBC 9.9   HGB 11.6*   HCT 35.5*            Current Facility-Administered Medications:     sodium chloride flush 0.9 % injection 10 mL, 10 mL, Intravenous, 2 times per day, Layne Dick MD    sodium chloride flush 0.9 % injection 10 mL, 10 mL, Intravenous, PRN, Layne Dick MD    rho(D) immune globulin (HYPERRHO S/D) injection 300 mcg, 300 mcg, Intramuscular, Once, Layne Dick MD    acetaminophen (TYLENOL) tablet 1,000 mg, 1,000 mg, Oral, Q6H PRN, Layne Dick MD, 1,000 mg at 05/15/19 0606    ibuprofen (ADVIL;MOTRIN) tablet 800 mg, 800 mg, Oral, Q8H PRN, Layne Dick MD, 800 mg at 05/15/19 0606    simethicone (MYLICON) chewable tablet 80 mg, 80 mg, Oral, Q6H PRN, Layne Dick MD    docusate sodium (COLACE) capsule 100 mg, 100 mg, Oral, BID, Layne Dick MD, 100 mg at 05/15/19 0913    magnesium hydroxide (MILK OF MAGNESIA) 400 MG/5ML suspension 30 mL, 30 mL, Oral, Daily PRN, Layne Dick MD    sennosides-docusate sodium (SENOKOT-S) 8.6-50 MG tablet 1 tablet, 1 tablet, Oral, Daily PRN, Layne Dick MD    ondansetron [facility]) injection 4 mg, 4 mg, Intravenous, Q6H PRN, Layne Dick MD    ondansetron (ZOFRAN-ODT) disintegrating tablet 4 mg, 4 mg, Oral, Q6H PRN, Layne Dick MD    famotidine

## 2019-05-15 NOTE — ANESTHESIA POSTPROCEDURE EVALUATION
Department of Anesthesiology  Postprocedure Note    Patient: Carlos Fairbanks  MRN: [id number]  [place]: 1995  Date of evaluation: 5/15/2019  Time:  12:03 PM     Procedure Summary     Date:  05/13/19 Room / Location:      Anesthesia Start:  0296 Anesthesia Stop:  05/14/19 0347    Procedure:  Labor Analgesia Diagnosis:      Scheduled Providers:   Responsible Provider:  Basil Sampson MD    Anesthesia Type:  regional, epidural ASA Status:  2 - Emergent          Anesthesia Type: regional, epidural    Evie Phase I: Evie Score: 9    Evie Phase II: Evie Score: 9    Last vitals: Reviewed and per EMR flowsheets. Anesthesia Post Evaluation    Patient location during evaluation: floor  Patient participation: complete - patient participated  Level of consciousness: awake and alert  Pain score: 0  Airway patency: patent  Nausea & Vomiting: no nausea and no vomiting  Complications: no  Cardiovascular status: hemodynamically stable  Respiratory status: spontaneous ventilation  Hydration status: stable    Patient s/p epidural for L&D. Pt denies residual numbness post block. Patient is ambulating and voiding without difficulty. Patient denies back pain, headache, paresthesias, n/v or pruritus. Epidural site is free of signs of infection.

## 2019-05-16 LAB
BASOPHILS ABSOLUTE: 0 K/UL (ref 0–0.2)
BASOPHILS RELATIVE PERCENT: 0.2 %
BILIRUBIN URINE: NEGATIVE
BLOOD, URINE: NEGATIVE
CLARITY: CLEAR
COLOR: YELLOW
EOSINOPHILS ABSOLUTE: 0.1 K/UL (ref 0–0.6)
EOSINOPHILS RELATIVE PERCENT: 0.9 %
GLUCOSE URINE: NEGATIVE MG/DL
HCT VFR BLD CALC: 30 % (ref 36–48)
HEMOGLOBIN: 9.9 G/DL (ref 12–16)
KETONES, URINE: NEGATIVE MG/DL
LEUKOCYTE ESTERASE, URINE: NEGATIVE
LYMPHOCYTES ABSOLUTE: 0.9 K/UL (ref 1–5.1)
LYMPHOCYTES RELATIVE PERCENT: 6.4 %
MCH RBC QN AUTO: 26.8 PG (ref 26–34)
MCHC RBC AUTO-ENTMCNC: 32.9 G/DL (ref 31–36)
MCV RBC AUTO: 81.5 FL (ref 80–100)
MICROSCOPIC EXAMINATION: NORMAL
MONOCYTES ABSOLUTE: 0.8 K/UL (ref 0–1.3)
MONOCYTES RELATIVE PERCENT: 5.7 %
NEUTROPHILS ABSOLUTE: 11.6 K/UL (ref 1.7–7.7)
NEUTROPHILS RELATIVE PERCENT: 86.8 %
NITRITE, URINE: NEGATIVE
PDW BLD-RTO: 15.1 % (ref 12.4–15.4)
PH UA: 6 (ref 5–8)
PLATELET # BLD: 232 K/UL (ref 135–450)
PMV BLD AUTO: 8.3 FL (ref 5–10.5)
PROTEIN UA: NEGATIVE MG/DL
RBC # BLD: 3.68 M/UL (ref 4–5.2)
SPECIFIC GRAVITY UA: 1.02 (ref 1–1.03)
URINE TYPE: NORMAL
UROBILINOGEN, URINE: 1 E.U./DL
WBC # BLD: 13.3 K/UL (ref 4–11)

## 2019-05-16 PROCEDURE — 87040 BLOOD CULTURE FOR BACTERIA: CPT

## 2019-05-16 PROCEDURE — 6360000002 HC RX W HCPCS: Performed by: OBSTETRICS & GYNECOLOGY

## 2019-05-16 PROCEDURE — 6370000000 HC RX 637 (ALT 250 FOR IP): Performed by: OBSTETRICS & GYNECOLOGY

## 2019-05-16 PROCEDURE — 1220000000 HC SEMI PRIVATE OB R&B

## 2019-05-16 PROCEDURE — 81003 URINALYSIS AUTO W/O SCOPE: CPT

## 2019-05-16 PROCEDURE — 85025 COMPLETE CBC W/AUTO DIFF WBC: CPT

## 2019-05-16 PROCEDURE — 2500000003 HC RX 250 WO HCPCS: Performed by: OBSTETRICS & GYNECOLOGY

## 2019-05-16 PROCEDURE — 87086 URINE CULTURE/COLONY COUNT: CPT

## 2019-05-16 PROCEDURE — 2580000003 HC RX 258: Performed by: OBSTETRICS & GYNECOLOGY

## 2019-05-16 RX ORDER — CLINDAMYCIN PHOSPHATE 900 MG/50ML
900 INJECTION INTRAVENOUS EVERY 8 HOURS
Status: DISCONTINUED | OUTPATIENT
Start: 2019-05-16 | End: 2019-05-17

## 2019-05-16 RX ORDER — SODIUM CHLORIDE 9 MG/ML
INJECTION, SOLUTION INTRAVENOUS
Status: DISPENSED
Start: 2019-05-16 | End: 2019-05-16

## 2019-05-16 RX ADMIN — GENTAMICIN SULFATE 321.6 MG: 40 INJECTION, SOLUTION INTRAMUSCULAR; INTRAVENOUS at 09:59

## 2019-05-16 RX ADMIN — CLINDAMYCIN PHOSPHATE 900 MG: 900 INJECTION, SOLUTION INTRAVENOUS at 08:57

## 2019-05-16 RX ADMIN — ACETAMINOPHEN 1000 MG: 500 TABLET, FILM COATED ORAL at 23:27

## 2019-05-16 RX ADMIN — DOCUSATE SODIUM 100 MG: 100 CAPSULE, LIQUID FILLED ORAL at 08:45

## 2019-05-16 RX ADMIN — DOCUSATE SODIUM 100 MG: 100 CAPSULE, LIQUID FILLED ORAL at 21:03

## 2019-05-16 RX ADMIN — CLINDAMYCIN PHOSPHATE 900 MG: 900 INJECTION, SOLUTION INTRAVENOUS at 16:40

## 2019-05-16 RX ADMIN — IBUPROFEN 800 MG: 800 TABLET, FILM COATED ORAL at 21:03

## 2019-05-16 RX ADMIN — Medication 10 ML: at 16:40

## 2019-05-16 RX ADMIN — IBUPROFEN 800 MG: 800 TABLET, FILM COATED ORAL at 12:59

## 2019-05-16 RX ADMIN — ACETAMINOPHEN 1000 MG: 500 TABLET, FILM COATED ORAL at 09:59

## 2019-05-16 RX ADMIN — Medication 10 ML: at 08:45

## 2019-05-16 RX ADMIN — IBUPROFEN 800 MG: 800 TABLET, FILM COATED ORAL at 02:54

## 2019-05-16 RX ADMIN — ACETAMINOPHEN 1000 MG: 500 TABLET, FILM COATED ORAL at 03:55

## 2019-05-16 RX ADMIN — FERROUS SULFATE TAB 325 MG (65 MG ELEMENTAL FE) 325 MG: 325 (65 FE) TAB at 08:45

## 2019-05-16 ASSESSMENT — PAIN SCALES - GENERAL
PAINLEVEL_OUTOF10: 1
PAINLEVEL_OUTOF10: 6
PAINLEVEL_OUTOF10: 3
PAINLEVEL_OUTOF10: 5
PAINLEVEL_OUTOF10: 0
PAINLEVEL_OUTOF10: 6

## 2019-05-16 NOTE — PLAN OF CARE
Problem: Urinary Retention:  Goal: Experiences of bladder distention will decrease  Description  Experiences of bladder distention will decrease  5/16/2019 1039 by Gretel Fatima RN  Outcome: Met This Shift     Problem: Urinary Retention:  Goal: Urinary elimination within specified parameters  Description  Urinary elimination within specified parameters  5/16/2019 1039 by Gretel Fatima RN  Outcome: Met This Shift     Problem: Pain:  Goal: Control of acute pain  Description  Control of acute pain  5/16/2019 1039 by Gretel Fatima RN  Outcome: Met This Shift     Problem: Pain:  Goal: Control of chronic pain  Description  Control of chronic pain  5/16/2019 1039 by Gretel Fatima RN  Outcome: Met This Shift     Problem: Mood - Altered:  Goal: Mood stable  Description  Mood stable  5/16/2019 1039 by Gretel Fatima RN  Outcome: Met This Shift     Problem: Discharge Planning:  Goal: Discharged to appropriate level of care  Description  Discharged to appropriate level of care  Outcome: Ongoing     Problem: Constipation:  Goal: Bowel elimination is within specified parameters  Description  Bowel elimination is within specified parameters  5/16/2019 1039 by Gretel Fatima RN  Outcome: Ongoing

## 2019-05-16 NOTE — PROGRESS NOTES
[facility] post-partum note    Post-partum Day #2    Subjective:    Breast feeding. Complains of fever this am.   Lochia: Normal  Denies congestion, sore throat, abdominal pain, cough, chest pain, leg pain, swelling, breast pain    Objective:  Vitals:    05/16/19 0254 05/16/19 0352 05/16/19 0451 05/16/19 0540   BP:       Pulse:       Resp:       Temp: 101.9 °F (38.8 °C) 102.7 °F (39.3 °C) 100.4 °F (38 °C) 98.9 °F (37.2 °C)   TempSrc: Oral Oral Oral Oral   SpO2:       Weight:       Height:           Physical Examination:  General: damp and sweaty  Lungs: CTS bilateral  Heart: RRR without murmur  Neck: supple, no adenopathy  Abd: soft NT, no flank tenderness  Breast: no engorgement, nipples WNL  Uterus: Firm, NT  Calves: NT, no edema    Labs:    Recent Labs     05/16/19  0448   WBC 13.3*   HGB 9.9*   HCT 30.0*        U/A:    Lab Results   Component Value Date    COLORU YELLOW 05/16/2019    PROTEINU Negative 05/16/2019    PHUR 6.0 05/16/2019    WBCUA 10 11/18/2018    RBCUA 9 11/18/2018    BACTERIA 3+ 11/18/2018    CLARITYU Clear 05/16/2019    SPECGRAV 1.018 05/16/2019    LEUKOCYTESUR Negative 05/16/2019    UROBILINOGEN 1.0 05/16/2019    BILIRUBINUR Negative 05/16/2019    BLOODU Negative 05/16/2019    GLUCOSEU Negative 05/16/2019     No results for input(s): NA, K, CL, CO2, BUN, CREATININE, CALCIUM, AST, ALT in the last 72 hours.     Invalid input(s): CORINA      Current Facility-Administered Medications:     gentamicin (GARAMYCIN) 358.4 mg in dextrose 5 % 250 mL IVPB, 5 mg/kg, Intravenous, Q24H, Rivera Chan MD    clindamycin (CLEOCIN) 900 mg in dextrose 5 % 50 mL IVPB, 900 mg, Intravenous, Q8H, Rivera Chan MD    sodium chloride flush 0.9 % injection 10 mL, 10 mL, Intravenous, 2 times per day, Dario Crane MD    sodium chloride flush 0.9 % injection 10 mL, 10 mL, Intravenous, PRN, Dario Crane MD    rho(D) immune globulin (HYPERRHO S/D) injection 300 mcg, 300 mcg, Intramuscular, Once, Jarred Mcmullen MD    acetaminophen (TYLENOL) tablet 1,000 mg, 1,000 mg, Oral, Q6H PRN, Jarred Mcmullen MD, 1,000 mg at 05/16/19 0355    ibuprofen (ADVIL;MOTRIN) tablet 800 mg, 800 mg, Oral, Q8H PRN, Jarred Mcmullen MD, 800 mg at 05/16/19 0254    simethicone (MYLICON) chewable tablet 80 mg, 80 mg, Oral, Q6H PRN, Jarred Mcmullen MD    docusate sodium (COLACE) capsule 100 mg, 100 mg, Oral, BID, Jarred Mcmullen MD, 100 mg at 05/15/19 2116    magnesium hydroxide (MILK OF MAGNESIA) 400 MG/5ML suspension 30 mL, 30 mL, Oral, Daily PRN, Jarred Mcmullen MD    sennosides-docusate sodium (SENOKOT-S) 8.6-50 MG tablet 1 tablet, 1 tablet, Oral, Daily PRN, Jarred Mcmullen MD    ondansetron [facility]) injection 4 mg, 4 mg, Intravenous, Q6H PRN, Jarred Mcmullen MD    ondansetron (ZOFRAN-ODT) disintegrating tablet 4 mg, 4 mg, Oral, Q6H PRN, Jarred Mcmullen MD    famotidine (PEPCID) tablet 20 mg, 20 mg, Oral, BID PRN, Jarred Mcmullen MD    ferrous sulfate tablet 325 mg, 325 mg, Oral, Daily, Jarred Mcmullen MD, 325 mg at 05/15/19 0913    measles, mumps and rubella vaccine (MMR) injection 0.5 mL, 0.5 mL, Subcutaneous, Prior to discharge, Jarred Mcmullen MD    Tetanus-Diphth-Acell Pertussis (BOOSTRIX) injection 0.5 mL, 0.5 mL, Intramuscular, Prior to discharge, Jarred Mcmullen MD    lanolin ointment, , Topical, PRN, Jarred Mcmullen MD    witch hazel-glycerin (TUCKS) pad, , Topical, PRN, Jarred Mcmullen MD    hydrocortisone 2.5 % cream, , Topical, Q2H PRN, Jarred Mcmullen MD    benzocaine-menthol (DERMOPLAST) 20-0.5 % spray, , Topical, PRN, Jarred Mcmullen MD    oxytocin (PITOCIN) 30 units in 500 mL infusion, 1 johnathan-units/min, Intravenous, Continuous, Jarred Mcmullen MD, Stopped at 05/14/19 0600    misoprostol (CYTOTEC) tablet 800 mcg, 800 mcg, Rectal, PRN, Jarred Mcmullen MD    oxyCODONE (ROXICODONE) immediate release tablet 5 mg, 5 mg, Oral, Q4H PRN, Jarred Mcmullen MD     Assessment/Plan:      Post Partum:

## 2019-05-17 ENCOUNTER — APPOINTMENT (OUTPATIENT)
Dept: CT IMAGING | Age: 24
DRG: 560 | End: 2019-05-17
Payer: MEDICAID

## 2019-05-17 LAB
BASOPHILS ABSOLUTE: 0.1 K/UL (ref 0–0.2)
BASOPHILS RELATIVE PERCENT: 0.4 %
CREAT SERPL-MCNC: <0.5 MG/DL (ref 0.6–1.1)
EOSINOPHILS ABSOLUTE: 0.3 K/UL (ref 0–0.6)
EOSINOPHILS RELATIVE PERCENT: 1.8 %
GFR AFRICAN AMERICAN: >60
GFR NON-AFRICAN AMERICAN: >60
HCT VFR BLD CALC: 31.6 % (ref 36–48)
HEMOGLOBIN: 10.3 G/DL (ref 12–16)
LYMPHOCYTES ABSOLUTE: 1.7 K/UL (ref 1–5.1)
LYMPHOCYTES RELATIVE PERCENT: 11.9 %
MCH RBC QN AUTO: 26.5 PG (ref 26–34)
MCHC RBC AUTO-ENTMCNC: 32.5 G/DL (ref 31–36)
MCV RBC AUTO: 81.5 FL (ref 80–100)
MONOCYTES ABSOLUTE: 0.8 K/UL (ref 0–1.3)
MONOCYTES RELATIVE PERCENT: 5.6 %
NEUTROPHILS ABSOLUTE: 11.5 K/UL (ref 1.7–7.7)
NEUTROPHILS RELATIVE PERCENT: 80.3 %
PDW BLD-RTO: 14.8 % (ref 12.4–15.4)
PLATELET # BLD: 267 K/UL (ref 135–450)
PMV BLD AUTO: 7.9 FL (ref 5–10.5)
RBC # BLD: 3.88 M/UL (ref 4–5.2)
WBC # BLD: 14.4 K/UL (ref 4–11)

## 2019-05-17 PROCEDURE — 6370000000 HC RX 637 (ALT 250 FOR IP): Performed by: OBSTETRICS & GYNECOLOGY

## 2019-05-17 PROCEDURE — 2500000003 HC RX 250 WO HCPCS: Performed by: OBSTETRICS & GYNECOLOGY

## 2019-05-17 PROCEDURE — 6360000004 HC RX CONTRAST MEDICATION: Performed by: OBSTETRICS & GYNECOLOGY

## 2019-05-17 PROCEDURE — 74177 CT ABD & PELVIS W/CONTRAST: CPT

## 2019-05-17 PROCEDURE — 87040 BLOOD CULTURE FOR BACTERIA: CPT

## 2019-05-17 PROCEDURE — 2580000003 HC RX 258: Performed by: OBSTETRICS & GYNECOLOGY

## 2019-05-17 PROCEDURE — 1220000000 HC SEMI PRIVATE OB R&B

## 2019-05-17 PROCEDURE — 71260 CT THORAX DX C+: CPT

## 2019-05-17 PROCEDURE — 82565 ASSAY OF CREATININE: CPT

## 2019-05-17 PROCEDURE — 85025 COMPLETE CBC W/AUTO DIFF WBC: CPT

## 2019-05-17 RX ADMIN — IBUPROFEN 800 MG: 800 TABLET, FILM COATED ORAL at 09:09

## 2019-05-17 RX ADMIN — DOCUSATE SODIUM 100 MG: 100 CAPSULE, LIQUID FILLED ORAL at 09:09

## 2019-05-17 RX ADMIN — Medication 10 ML: at 00:58

## 2019-05-17 RX ADMIN — TAZOBACTAM SODIUM AND PIPERACILLIN SODIUM 3.38 G: 375; 3 INJECTION, SOLUTION INTRAVENOUS at 10:19

## 2019-05-17 RX ADMIN — Medication 10 ML: at 18:24

## 2019-05-17 RX ADMIN — ACETAMINOPHEN 1000 MG: 500 TABLET, FILM COATED ORAL at 10:34

## 2019-05-17 RX ADMIN — CLINDAMYCIN PHOSPHATE 900 MG: 900 INJECTION, SOLUTION INTRAVENOUS at 09:10

## 2019-05-17 RX ADMIN — IBUPROFEN 800 MG: 800 TABLET, FILM COATED ORAL at 17:07

## 2019-05-17 RX ADMIN — Medication 10 ML: at 10:19

## 2019-05-17 RX ADMIN — IOPAMIDOL 75 ML: 755 INJECTION, SOLUTION INTRAVENOUS at 21:23

## 2019-05-17 RX ADMIN — IOPAMIDOL 75 ML: 755 INJECTION, SOLUTION INTRAVENOUS at 14:27

## 2019-05-17 RX ADMIN — Medication 10 ML: at 09:10

## 2019-05-17 RX ADMIN — CLINDAMYCIN PHOSPHATE 900 MG: 900 INJECTION, SOLUTION INTRAVENOUS at 01:00

## 2019-05-17 RX ADMIN — FERROUS SULFATE TAB 325 MG (65 MG ELEMENTAL FE) 325 MG: 325 (65 FE) TAB at 09:09

## 2019-05-17 RX ADMIN — Medication 10 ML: at 21:01

## 2019-05-17 RX ADMIN — TAZOBACTAM SODIUM AND PIPERACILLIN SODIUM 3.38 G: 375; 3 INJECTION, SOLUTION INTRAVENOUS at 18:23

## 2019-05-17 RX ADMIN — IOPAMIDOL 66 ML: 755 INJECTION, SOLUTION INTRAVENOUS at 21:30

## 2019-05-17 ASSESSMENT — PAIN SCALES - GENERAL: PAINLEVEL_OUTOF10: 0

## 2019-05-17 NOTE — FLOWSHEET NOTE
Introduced self to pt. [facility] Pt alert and patting infant. Pt denies pain or needs at this time.

## 2019-05-17 NOTE — PROGRESS NOTES
Pt with fever to 101.7 this am.  No etiology found  Change abx to Zosyn  CT of abd/pelvis with IV contrast

## 2019-05-17 NOTE — PROGRESS NOTES
sennosides-docusate sodium (SENOKOT-S) 8.6-50 MG tablet 1 tablet, 1 tablet, Oral, Daily PRN, Lary Stewart MD    ondansetron [facility]) injection 4 mg, 4 mg, Intravenous, Q6H PRN, Lary Stewart MD    ondansetron (ZOFRAN-ODT) disintegrating tablet 4 mg, 4 mg, Oral, Q6H PRN, Lary Stewart MD    famotidine (PEPCID) tablet 20 mg, 20 mg, Oral, BID PRN, Lary Stewart MD    ferrous sulfate tablet 325 mg, 325 mg, Oral, Daily, Lary Stewart MD, 325 mg at 05/16/19 0845    measles, mumps and rubella vaccine (MMR) injection 0.5 mL, 0.5 mL, Subcutaneous, Prior to discharge, Lary Stewart MD    Tetanus-Diphth-Acell Pertussis (BOOSTRIX) injection 0.5 mL, 0.5 mL, Intramuscular, Prior to discharge, Lary Stewart MD    lanolin ointment, , Topical, PRN, Lary Stewart MD    witch hazel-glycerin (TUCKS) pad, , Topical, PRN, Lary Stewart MD    hydrocortisone 2.5 % cream, , Topical, Q2H PRN, Lary Stewart MD    benzocaine-menthol (DERMOPLAST) 20-0.5 % spray, , Topical, PRN, Lary Stewart MD    oxytocin (PITOCIN) 30 units in 500 mL infusion, 1 johnathan-units/min, Intravenous, Continuous, Lary Stewart MD, Stopped at 05/14/19 0600    misoprostol (CYTOTEC) tablet 800 mcg, 800 mcg, Rectal, PRN, Lary Stewart MD    oxyCODONE (ROXICODONE) immediate release tablet 5 mg, 5 mg, Oral, Q4H PRN, Lary Stewart MD     Assessment/Plan:      Post Partum: Continue Postpartum care  Tm to 100.4 lst night.   [id number]  Fever spikes less high now  Continue antibiotics

## 2019-05-18 LAB
HCT VFR BLD CALC: 30.6 % (ref 36–48)
HEMOGLOBIN: 9.8 G/DL (ref 12–16)
MCH RBC QN AUTO: 26.5 PG (ref 26–34)
MCHC RBC AUTO-ENTMCNC: 32.1 G/DL (ref 31–36)
MCV RBC AUTO: 82.8 FL (ref 80–100)
PDW BLD-RTO: 15 % (ref 12.4–15.4)
PLATELET # BLD: 291 K/UL (ref 135–450)
PMV BLD AUTO: 7.9 FL (ref 5–10.5)
RBC # BLD: 3.7 M/UL (ref 4–5.2)
URINE CULTURE, ROUTINE: NORMAL
WBC # BLD: 11.9 K/UL (ref 4–11)

## 2019-05-18 PROCEDURE — 1220000000 HC SEMI PRIVATE OB R&B

## 2019-05-18 PROCEDURE — 2500000003 HC RX 250 WO HCPCS: Performed by: OBSTETRICS & GYNECOLOGY

## 2019-05-18 PROCEDURE — 85027 COMPLETE CBC AUTOMATED: CPT

## 2019-05-18 PROCEDURE — 2580000003 HC RX 258: Performed by: INTERNAL MEDICINE

## 2019-05-18 PROCEDURE — 6370000000 HC RX 637 (ALT 250 FOR IP): Performed by: INTERNAL MEDICINE

## 2019-05-18 PROCEDURE — 6370000000 HC RX 637 (ALT 250 FOR IP): Performed by: OBSTETRICS & GYNECOLOGY

## 2019-05-18 PROCEDURE — 6360000002 HC RX W HCPCS: Performed by: INTERNAL MEDICINE

## 2019-05-18 PROCEDURE — 2580000003 HC RX 258: Performed by: OBSTETRICS & GYNECOLOGY

## 2019-05-18 PROCEDURE — 87641 MR-STAPH DNA AMP PROBE: CPT

## 2019-05-18 PROCEDURE — 99255 IP/OBS CONSLTJ NEW/EST HI 80: CPT | Performed by: INTERNAL MEDICINE

## 2019-05-18 RX ORDER — LACTOBACILLUS RHAMNOSUS GG 10B CELL
1 CAPSULE ORAL 2 TIMES DAILY WITH MEALS
Status: DISCONTINUED | OUTPATIENT
Start: 2019-05-18 | End: 2019-05-21 | Stop reason: HOSPADM

## 2019-05-18 RX ORDER — SODIUM CHLORIDE 9 MG/ML
INJECTION, SOLUTION INTRAVENOUS
Status: DISPENSED
Start: 2019-05-18 | End: 2019-05-18

## 2019-05-18 RX ADMIN — ACETAMINOPHEN 1000 MG: 500 TABLET, FILM COATED ORAL at 22:09

## 2019-05-18 RX ADMIN — TAZOBACTAM SODIUM AND PIPERACILLIN SODIUM 3.38 G: 375; 3 INJECTION, SOLUTION INTRAVENOUS at 10:52

## 2019-05-18 RX ADMIN — ACETAMINOPHEN 1000 MG: 500 TABLET, FILM COATED ORAL at 12:29

## 2019-05-18 RX ADMIN — IBUPROFEN 800 MG: 800 TABLET, FILM COATED ORAL at 02:18

## 2019-05-18 RX ADMIN — Medication 10 ML: at 06:35

## 2019-05-18 RX ADMIN — Medication 10 ML: at 22:00

## 2019-05-18 RX ADMIN — FERROUS SULFATE TAB 325 MG (65 MG ELEMENTAL FE) 325 MG: 325 (65 FE) TAB at 09:16

## 2019-05-18 RX ADMIN — TAZOBACTAM SODIUM AND PIPERACILLIN SODIUM 3.38 G: 375; 3 INJECTION, SOLUTION INTRAVENOUS at 02:20

## 2019-05-18 RX ADMIN — DOCUSATE SODIUM 100 MG: 100 CAPSULE, LIQUID FILLED ORAL at 21:59

## 2019-05-18 RX ADMIN — Medication 1 CAPSULE: at 17:37

## 2019-05-18 RX ADMIN — SODIUM CHLORIDE 1 G: 900 INJECTION INTRAVENOUS at 17:37

## 2019-05-18 RX ADMIN — DOCUSATE SODIUM 100 MG: 100 CAPSULE, LIQUID FILLED ORAL at 09:16

## 2019-05-18 ASSESSMENT — PAIN SCALES - GENERAL
PAINLEVEL_OUTOF10: 0
PAINLEVEL_OUTOF10: 4
PAINLEVEL_OUTOF10: 4
PAINLEVEL_OUTOF10: 2
PAINLEVEL_OUTOF10: 0
PAINLEVEL_OUTOF10: 5
PAINLEVEL_OUTOF10: 4

## 2019-05-18 ASSESSMENT — ENCOUNTER SYMPTOMS
EYE DISCHARGE: 0
STRIDOR: 0
CHOKING: 0
RHINORRHEA: 0
DIARRHEA: 0
CHEST TIGHTNESS: 0
FACIAL SWELLING: 0
SHORTNESS OF BREATH: 0
PHOTOPHOBIA: 0
NAUSEA: 0
ABDOMINAL PAIN: 0
EYE REDNESS: 0
TROUBLE SWALLOWING: 0
COLOR CHANGE: 0
APNEA: 0
COUGH: 0
BLOOD IN STOOL: 0

## 2019-05-18 ASSESSMENT — PAIN DESCRIPTION - PROGRESSION: CLINICAL_PROGRESSION: GRADUALLY IMPROVING

## 2019-05-18 ASSESSMENT — PAIN - FUNCTIONAL ASSESSMENT
PAIN_FUNCTIONAL_ASSESSMENT: ACTIVITIES ARE NOT PREVENTED
PAIN_FUNCTIONAL_ASSESSMENT: ACTIVITIES ARE NOT PREVENTED

## 2019-05-18 ASSESSMENT — PAIN DESCRIPTION - LOCATION
LOCATION: BACK;HEAD
LOCATION: BACK;HEAD

## 2019-05-18 ASSESSMENT — PAIN DESCRIPTION - ORIENTATION
ORIENTATION: LOWER
ORIENTATION: LOWER

## 2019-05-18 ASSESSMENT — PAIN DESCRIPTION - PAIN TYPE
TYPE: ACUTE PAIN
TYPE: ACUTE PAIN

## 2019-05-18 ASSESSMENT — PAIN DESCRIPTION - ONSET
ONSET: ON-GOING
ONSET: ON-GOING

## 2019-05-18 ASSESSMENT — PAIN DESCRIPTION - FREQUENCY
FREQUENCY: CONTINUOUS
FREQUENCY: CONTINUOUS

## 2019-05-18 ASSESSMENT — PAIN DESCRIPTION - DESCRIPTORS
DESCRIPTORS: ACHING
DESCRIPTORS: ACHING

## 2019-05-18 NOTE — PLAN OF CARE
Problem: Pain:  Goal: Control of acute pain  Description  Control of acute pain  5/18/2019 1802 by Olga Gonzales RN  Outcome: Met This Shift     Problem: Discharge Planning:  Goal: Discharged to appropriate level of care  Description  Discharged to appropriate level of care  Outcome: Ongoing   Pt remains on pp unit due to fevers

## 2019-05-18 NOTE — PROGRESS NOTES
Appreciate consults from Hospitalist and ID  Fever with neg w/u so far  Low risk for puerperal source  Will follow along

## 2019-05-18 NOTE — CONSULTS
past medical history of Anemia. Past Surgical History:   has no past surgical history on file. Medications:   piperacillin-tazobactam  3.375 g Intravenous Q8H    sodium chloride flush  10 mL Intravenous 2 times per day    rho(D) immune globulin  300 mcg Intramuscular Once    docusate sodium  100 mg Oral BID    ferrous sulfate  325 mg Oral Daily    measles, mumps and rubella vaccine  0.5 mL Subcutaneous Prior to discharge    Tdap-Dtap  0.5 mL Intramuscular Prior to discharge       Allergies:  No Known Allergies     Social History:   reports that she has never smoked. She has never used smokeless tobacco. She reports that she does not drink alcohol or use drugs. Family History:  family history is not on file. , not pertinent    Physical Exam:  /78   Pulse 100   Temp 100.5 °F (38.1 °C)   Resp 18   Ht 5' 6\" (1.676 m)   Wt 158 lb (71.7 kg)   SpO2 98%   Breastfeeding? Unknown   BMI 25.50 kg/m²     General appearance: Appears comfortable. Well nourished  Eyes: Sclera clear, pupils equal  ENT: Moist mucus membranes, no thrush  Neck: Trachea midline, symmetrical  Cardiovascular: Regular rhythm, normal S1, S2. No murmur, gallop, rub. No edema in  lower extremities  Respiratory: Clear to auscultation bilaterally. No wheeze. Good inspiratory effort  Gastrointestinal: Abdomen soft, not tender, not distended, normal bowel sounds  Musculoskeletal: No cyanosis in digits, warm extremities  Neurologic: Cranial nerves grossly intact, no motor or speech deficits. Psychiatric: Normal affect. Alert and oriented to time, place and person.   Skin: Warm, dry, normal turgor, no rash    Labs:  CBC:   Lab Results   Component Value Date    WBC 14.4 05/17/2019    RBC 3.88 05/17/2019    HGB 10.3 05/17/2019    HCT 31.6 05/17/2019    MCV 81.5 05/17/2019    MCH 26.5 05/17/2019    MCHC 32.5 05/17/2019    RDW 14.8 05/17/2019     05/17/2019    MPV 7.9 05/17/2019     BMP:    Lab Results   Component Value Date  11/18/2018    K 4.0 11/18/2018     11/18/2018    CO2 22 11/18/2018    BUN 4 11/18/2018    CREATININE <0.5 05/17/2019    CALCIUM 8.9 11/18/2018    GFRAA >60 05/17/2019    LABGLOM >60 05/17/2019    GLUCOSE 93 11/18/2018         CXR:      EKG:       Problem List:    Active Problems:    Threatened labor    Threatened labor at term  Resolved Problems:    * No resolved hospital problems. *       Assessment & Plan:     1. Febrile illness post partem  Repeat blood cultures x 2   CT abdomen pelvis is unremarkable  UA is normal  CT chest ordered to r/out PE  She is on Zosyn at this time  ID consulted     Normocytic Anemia hb 10.3  On iron supplements      Thank you for the opportunity to participate in the care of your patient.     Betsy Douglas MD   5/17/19

## 2019-05-18 NOTE — PROGRESS NOTES
101/65   Pulse 87   Temp 97.8 °F (36.6 °C) (Oral)   Resp 16   Ht 5' 6\" (1.676 m)   Wt 158 lb (71.7 kg)   SpO2 98%   Breastfeeding? Unknown   BMI 25.50 kg/m²   No intake or output data in the 24 hours ending 05/18/19 0956 Wt Readings from Last 3 Encounters:   05/12/19 158 lb (71.7 kg)   05/04/19 155 lb (70.3 kg)   03/26/19 150 lb (68 kg)       General appearance:  Appears comfortable  Eyes: Sclera clear. Pupils equal.  ENT: Moist oral mucosa. Trachea midline, no adenopathy. Cardiovascular: Regular rhythm, normal S1, S2. No murmur. No edema in lower extremities  Respiratory: Not using accessory muscles. Good inspiratory effort. Clear to auscultation bilaterally, no wheeze or crackles. GI: Abdomen soft, no tenderness, not distended, normal bowel sounds  Musculoskeletal: No cyanosis in digits, neck supple  Neurology: CN 2-12 grossly intact. No speech or motor deficits  Psych: Normal affect. Alert and oriented in time, place and person  Skin: Warm, dry, normal turgor    Labs and Tests:  CBC:   Recent Labs     05/16/19  0448 05/17/19  0554 05/18/19  0600   WBC 13.3* 14.4* 11.9*   HGB 9.9* 10.3* 9.8*    267 291     BMP:  Recent Labs     05/17/19  1120   CREATININE <0.5*         Problem List  Active Problems:    Threatened labor    Threatened labor at term  Resolved Problems:    * No resolved hospital problems. *       Assessment & Plan:   1. Febrile illness post partum Repeat blood cultures x 2  CT abdomen pelvis is unremarkable just showing postpartum changes  UA is normal , blood, urine culture pending. CT chest showed no evidence of pulmonary embolism. Continue with Zosyn at this time,   ID consulted   2. Normocytic Anemia hb 10.3  On iron supplements       Diet: DIET GENERAL;  Code:Full Code  DVT PPX lovenox       Iris Holcomb MD   5/18/2019 9:56 AM

## 2019-05-18 NOTE — CONSULTS
Infectious Diseases   Consult Note        Admission Date: 5/12/2019  Hospital Day: Hospital Day: 7  Attending: Rich Vasquez MD  Date of service: 5/18/19    Presenting complaint:   Chief Complaint   Patient presents with    Contractions       Reason for admission: Threatened labor [O47.9]  Threatened labor at term [O47.9]    Chief complaint/ Reason for consult: Sepsis with high-grade fever and leukocytosis     Problem list:       Patient Active Problem List   Diagnosis Code    Threatened labor O47.9    Threatened labor at term O50.10    Sepsis ([address] A41.9    Puerperal pyrexia of unknown origin, delivered, with mention of postpartum complication O56.4    Normal spontaneous vaginal delivery O80    Anemia D64.9         Microbiology:        I have reviewed allavailable micro lab data and cultures    · Blood culture (1/2) - collected on 5/12/2019: *In process  · Blood culture (2/2) - collectedon 5/12/2019: In process  · Urine culture  - collected on 5/12/2019: In process        Assessment:     The patient is a 20 y.o. old female who  has a past medical history of Anemia. with following problems:    · Sepsis with high-grade fever and leukocytosis - gram-negative bacteremia suspected  · Postpartum fever  · Anemia  · Status post normal vaginal delivery on 5/14/19 of a live baby boy  · Status post second-degree laceration repair on 5/14/19  · Immigrant from [place]      Discussion:      The patient had a low-grade fever of 99.7 on the morning of 5/14/19. Fever went up to 102.7 on the morning of 5/16/19    One set of blood culture and urine culture were sent on 5/16/19 and are in process. 2 sets of blood cultures have been sent yesterday    The patient had a CT scan of abdomen and pelvis with IV contrast yesterday. No obvious abscess. CT angiogram of the chest reviewed. No evidence of pulmonary embolism or pneumonia.     Plan:     Diagnostic Workup:    · Agree with blood cultures  · Will order nasal MRSA screen  · Continue to follow fever curve, WBC count and blood cultures  · Follow up on liverand renal functions closely    Antimicrobials:    · Immigrant from [place]. AT risk of having ESBL  · Will switch from IV Zosyn to iv meropenem 1 g every 8 hours. · Continue to monitor her vitals closely  · If the fever curve starts going up, we'll add IV linezolid 600 mg every 12 hours for empiric MRSA coverage  · Will follow-up on the culture results and will make further changes in the antibiotics accordingly  · Surgical suture site care  · May need to consider doing bottle feed for few days while on meropenem  · DVT prophylaxis  · Discussed the above plan with patient and RN   · Discussed all above with  at bedside    Drug Monitoring:    · Continue serial monitoring for antibiotic toxicity as follows: *CMP  · Continue to watch for following: new or worsening fever, hypotension, hives, lip swelling and redness or purulence at vascular access sites. I/v access Management:    · Continue to monitor i.v access sites for erythema, induration, discharge or tenderness. · As always, continue efforts to minimizetubes/lines/drains as clinically appropriate to reduce chances of line associated infections. Patient education and counseling:      · The patient was educated in detailabout the side-effects of various antibiotics and things to watch for like new rashes, lip swelling, severe reaction, worsening diarrhea, break through fever etc.  · Discussed patient'scondition and what to expect. All of the patient's questions were addressed in a satisfactory manner and patient verbalized understanding all instructions. Risk of Complications/Morbidity: High     · Illness(es)/ Infection present that pose threat to life/bodily function. · There is potential for severe exacerbation of infection/side effects of treatment. · Therapy requires intensive monitoring for antimicrobial agent toxicity.     Thank you for involving me in the care of your patient. I will continue to follow. If you have any additional questions, please do not hesitate to contact me. Subjective:       HPI: Guy Segura is a 20 y.o. female patient, who was seen at the request of Dr. Tim Crum MD.    History was obtained from chart review and the patient. The patient was admitted on 5/12/2019. I have been consulted to see the patient for above mentioned reason(s). The patient has multiple medical comorbidities, and presented to the hospital with active labor. The patient received epidural anesthesia and had a normal vaginal delivery of a baby boy on 5/14/19 morning followed by spontaneous delivery of the placenta. She did experience a second-degree laceration that was repaired    The patient started having fever up to 100.3 the next day and the fever went up to 102.7 on the morning of 5/15/19. WBC count was 13,300 on 5/16/19 and went up to 14,400 the next day. She was started on IV gentamicin and IV clindamycin. The antibiotic was changed to IV Zosyn yesterday by obstetrics team.      I have been asked to see the patient for this complicated infection. Past Medical History: All past medical history reviewed today. Past Medical History:   Diagnosis Date    Anemia     taking iron         Past Surgical History: All pastsurgical history was reviewed today. History reviewed. No pertinent surgical history. Social History:  All social andepidemiologic history was reviewed and updated by me today as needed. · Tobacco use:   reports that she has never smoked. She has never used smokeless tobacco.  · Alcohol use:   reports that she does not drink alcohol. · Currently lives in: [address] [place] [address]  ·  reports that she does not use drugs. Immunization History: All immunization history was reviewed by me today.     There is no immunization history for the selected administration types on file for this patient. Family History: All family history was reviewed today. History reviewed. No pertinent family history. Medications: All current and past medications were reviewed. Medications Prior to Admission: [DISCONTINUED] PROFE 391.3 (180 Fe) MG CAPS, Take 180 mg by mouth daily  [DISCONTINUED] ferrous sulfate 325 (65 Fe) MG tablet, Take 325 mg by mouth daily (with breakfast)  [DISCONTINUED] Prenatal Multivit-Min-Fe-FA (PRENATAL 1 + IRON PO), Take by mouth     piperacillin-tazobactam  3.375 g Intravenous Q8H    sodium chloride flush  10 mL Intravenous 2 times per day    rho(D) immune globulin  300 mcg Intramuscular Once    docusate sodium  100 mg Oral BID    ferrous sulfate  325 mg Oral Daily    measles, mumps and rubella vaccine  0.5 mL Subcutaneous Prior to discharge    Tdap-Dtap  0.5 mL Intramuscular Prior to discharge       Current antibiotics: All antibiotics and their doses were reviewed by me    Recent Abx Admin                   piperacillin-tazobactam (ZOSYN) 3.375 g in dextrose 50 mL IVPB extended infusion (premix) (g) 3.375 g New Bag 05/18/19 0220     3.375 g New Bag 05/17/19 1823     3.375 g New Bag  1019                   REVIEW OF SYSTEMS:       Review of Systems   Constitutional: Positive for fatigue and fever. Negative for chills, diaphoresis and unexpected weight change. HENT: Negative for congestion, ear discharge, ear pain, facial swelling, hearing loss, rhinorrhea and trouble swallowing. Eyes: Negative for photophobia, discharge, redness and visual disturbance. Respiratory: Negative for apnea, cough, choking, chest tightness, shortness of breath and stridor. Cardiovascular: Negative for chest pain and palpitations. Gastrointestinal: Negative for abdominal pain, blood in stool, diarrhea and nausea. Endocrine: Negative for polydipsia, polyphagia and polyuria.    Genitourinary: Negative for difficulty urinating, dysuria, frequency, hematuria, menstrual problem and vaginal discharge. Musculoskeletal: Negative for arthralgias, joint swelling, myalgias and neck stiffness. Skin: Negative for color change and rash. Allergic/Immunologic: Negative for immunocompromised state. Neurological: Positive for headaches. Negative for dizziness, seizures, speech difficulty and light-headedness. Hematological: Negative for adenopathy. Psychiatric/Behavioral: Negative for agitation, hallucinations and suicidal ideas. Objective:       PHYSICAL EXAM:      Vitals:   Vitals:    05/18/19 0100 05/18/19 0302 05/18/19 0600 05/18/19 0900   BP: 120/76   101/65   Pulse: 88   87   Resp: 16   16   Temp: 99.9 °F (37.7 °C) 100.5 °F (38.1 °C) 98.4 °F (36.9 °C) 97.8 °F (36.6 °C)   TempSrc: Oral   Oral   SpO2:       Weight:       Height:           Physical Exam   Constitutional: She is oriented to person, place, and time. She appears well-developed. No distress. Feels tired, has headache in setting of fever   HENT:   Head: Normocephalic. Right Ear: External ear normal.   Left Ear: External ear normal.   Nose: Nose normal.   Mouth/Throat: Oropharynx is clear and moist.   Eyes: Pupils are equal, round, and reactive to light. Conjunctivae are normal. Right eye exhibits no discharge. Left eye exhibits no discharge. No scleral icterus. Neck: Normal range of motion. Neck supple. Cardiovascular: Normal rate and regular rhythm. Exam reveals no friction rub. No murmur heard. Pulmonary/Chest: Effort normal. No stridor. She has no wheezes. She has no rales. She exhibits no tenderness. Abdominal: Soft. She exhibits no mass. There is no tenderness. There is no rebound and no guarding. Musculoskeletal: She exhibits no edema or tenderness. Lymphadenopathy:     She has no cervical adenopathy. Neurological: She is alert and oriented to person, place, and time. She exhibits normal muscle tone. Skin: Skin is warm and dry. No rash noted. She is not diaphoretic. No erythema.    Psychiatric:

## 2019-05-19 LAB
BILIRUBIN URINE: NEGATIVE
BLOOD, URINE: ABNORMAL
CLARITY: CLEAR
COLOR: YELLOW
EPITHELIAL CELLS, UA: 1 /HPF (ref 0–5)
GLUCOSE URINE: NEGATIVE MG/DL
HYALINE CASTS: 1 /LPF (ref 0–8)
KETONES, URINE: NEGATIVE MG/DL
LEUKOCYTE ESTERASE, URINE: ABNORMAL
MICROSCOPIC EXAMINATION: YES
MRSA SCREEN RT-PCR: NORMAL
NITRITE, URINE: NEGATIVE
PH UA: 6.5 (ref 5–8)
PROTEIN UA: NEGATIVE MG/DL
RBC UA: 12 /HPF (ref 0–4)
SPECIFIC GRAVITY UA: 1.01 (ref 1–1.03)
URINE REFLEX TO CULTURE: YES
URINE TYPE: ABNORMAL
UROBILINOGEN, URINE: 0.2 E.U./DL
WBC UA: 9 /HPF (ref 0–5)

## 2019-05-19 PROCEDURE — 36415 COLL VENOUS BLD VENIPUNCTURE: CPT

## 2019-05-19 PROCEDURE — 2580000003 HC RX 258

## 2019-05-19 PROCEDURE — 81001 URINALYSIS AUTO W/SCOPE: CPT

## 2019-05-19 PROCEDURE — 87086 URINE CULTURE/COLONY COUNT: CPT

## 2019-05-19 PROCEDURE — 6360000002 HC RX W HCPCS: Performed by: INTERNAL MEDICINE

## 2019-05-19 PROCEDURE — 99233 SBSQ HOSP IP/OBS HIGH 50: CPT | Performed by: INTERNAL MEDICINE

## 2019-05-19 PROCEDURE — 2580000003 HC RX 258: Performed by: OBSTETRICS & GYNECOLOGY

## 2019-05-19 PROCEDURE — 84145 PROCALCITONIN (PCT): CPT

## 2019-05-19 PROCEDURE — 6370000000 HC RX 637 (ALT 250 FOR IP): Performed by: INTERNAL MEDICINE

## 2019-05-19 PROCEDURE — 1220000000 HC SEMI PRIVATE OB R&B

## 2019-05-19 PROCEDURE — 2580000003 HC RX 258: Performed by: INTERNAL MEDICINE

## 2019-05-19 PROCEDURE — 6370000000 HC RX 637 (ALT 250 FOR IP): Performed by: OBSTETRICS & GYNECOLOGY

## 2019-05-19 RX ORDER — SODIUM CHLORIDE, SODIUM LACTATE, POTASSIUM CHLORIDE, CALCIUM CHLORIDE 600; 310; 30; 20 MG/100ML; MG/100ML; MG/100ML; MG/100ML
INJECTION, SOLUTION INTRAVENOUS CONTINUOUS
Status: DISCONTINUED | OUTPATIENT
Start: 2019-05-19 | End: 2019-05-21 | Stop reason: HOSPADM

## 2019-05-19 RX ORDER — SODIUM CHLORIDE, SODIUM LACTATE, POTASSIUM CHLORIDE, CALCIUM CHLORIDE 600; 310; 30; 20 MG/100ML; MG/100ML; MG/100ML; MG/100ML
INJECTION, SOLUTION INTRAVENOUS
Status: COMPLETED
Start: 2019-05-19 | End: 2019-05-19

## 2019-05-19 RX ORDER — LINEZOLID 2 MG/ML
600 INJECTION, SOLUTION INTRAVENOUS EVERY 12 HOURS
Status: DISCONTINUED | OUTPATIENT
Start: 2019-05-19 | End: 2019-05-21 | Stop reason: HOSPADM

## 2019-05-19 RX ADMIN — SODIUM CHLORIDE 1 G: 900 INJECTION INTRAVENOUS at 17:57

## 2019-05-19 RX ADMIN — DOCUSATE SODIUM 100 MG: 100 CAPSULE, LIQUID FILLED ORAL at 08:17

## 2019-05-19 RX ADMIN — Medication 10 ML: at 02:36

## 2019-05-19 RX ADMIN — SODIUM CHLORIDE 1 G: 900 INJECTION INTRAVENOUS at 10:05

## 2019-05-19 RX ADMIN — SODIUM CHLORIDE 1 G: 900 INJECTION INTRAVENOUS at 01:54

## 2019-05-19 RX ADMIN — Medication 1 CAPSULE: at 08:31

## 2019-05-19 RX ADMIN — IBUPROFEN 800 MG: 800 TABLET, FILM COATED ORAL at 13:30

## 2019-05-19 RX ADMIN — Medication 1 CAPSULE: at 18:45

## 2019-05-19 RX ADMIN — LINEZOLID 600 MG: 600 INJECTION, SOLUTION INTRAVENOUS at 13:33

## 2019-05-19 RX ADMIN — FERROUS SULFATE TAB 325 MG (65 MG ELEMENTAL FE) 325 MG: 325 (65 FE) TAB at 08:17

## 2019-05-19 RX ADMIN — SODIUM CHLORIDE, POTASSIUM CHLORIDE, SODIUM LACTATE AND CALCIUM CHLORIDE 1000 ML: 600; 310; 30; 20 INJECTION, SOLUTION INTRAVENOUS at 10:06

## 2019-05-19 RX ADMIN — ACETAMINOPHEN 1000 MG: 500 TABLET, FILM COATED ORAL at 08:31

## 2019-05-19 ASSESSMENT — ENCOUNTER SYMPTOMS
STRIDOR: 0
FACIAL SWELLING: 0
BLOOD IN STOOL: 0
COUGH: 0
TROUBLE SWALLOWING: 0
SHORTNESS OF BREATH: 0
CHEST TIGHTNESS: 0
COLOR CHANGE: 0
PHOTOPHOBIA: 0
NAUSEA: 0
ABDOMINAL PAIN: 1
APNEA: 0
CHOKING: 0
DIARRHEA: 0
EYE REDNESS: 0
RHINORRHEA: 0
EYE DISCHARGE: 0

## 2019-05-19 ASSESSMENT — PAIN SCALES - GENERAL
PAINLEVEL_OUTOF10: 2
PAINLEVEL_OUTOF10: 3

## 2019-05-19 NOTE — PROGRESS NOTES
[facility] post-partum note    Post-partum Day #5    Subjective:    No c/o, some cramping earlier which has resolved  Lochia: Normal    Objective:  Vitals:    05/19/19 0130 05/19/19 0831 05/19/19 1009 05/19/19 1215   BP: 110/67 111/74  109/72   Pulse: 73 94  72   Resp: 18 18 18   Temp: 98.3 °F (36.8 °C) 101.9 °F (38.8 °C) 98.6 °F (37 °C) 98 °F (36.7 °C)   TempSrc: Oral Oral Oral Oral   SpO2:       Weight:       Height:           Physical Examination:  Appears well  Uterus: Firm, NT  Calves: NT    Labs:    Recent Labs     05/17/19  0554 05/18/19  0600   WBC 14.4* 11.9*   HGB 10.3* 9.8*   HCT 31.6* 30.6*    291     Recent Labs     05/17/19  1120   CREATININE <0.5*       Assessment/Plan:      Post Partum: Continue Postpartum care  Tm 101.9, no obvious source of infection, ID and hospitalist managing evaluation, on meropenem with linezolid added today, cultures negative to date; will continue to follow; appreciate input

## 2019-05-19 NOTE — PROGRESS NOTES
Infectious Diseases   Progress Note      Admission Date: 5/12/2019  Hospital Day: Hospital Day: 8  Attending: Avni Yuan MD  Date of service: 5/19/2019    Presenting complaint:   Chief Complaint   Patient presents with    Contractions       Chief complaint/ Reason for consult: The patient was seen today for the following:    · Sepsis with high-grade fever and leukocytosis - gram-negative bacteremia suspected  · Postpartum fever  · Anemia    Microbiology:        I have reviewed all available micro lab data and cultures    · Blood culture (2/2) - collected on 5/16/19 and 5/17/19: Negative  · Urine culture  - collected on 5/16/2019: Negative        Problem list:       Patient Active Problem List   Diagnosis Code    Threatened labor O47.9    Threatened labor at term O50.10    Sepsis ([facility] [address] A41.9    Puerperal pyrexia of unknown origin, delivered, with mention of postpartum complication W59.1    Normal spontaneous vaginal delivery O80    Anemia D64.9         Assessment:     The patient is a 20 y.o. old female who  has a past medical history of Anemia. with following problems:    · Sepsis with high-grade fever and leukocytosis  having fever despite iv meropenem  · Fever of unknown origin  · Left lower quadrant tenderness - new  · Postpartum fever - ongoing  · Anemia  · Status post normal vaginal delivery on 5/14/19 of a live baby boy  · Status post second-degree laceration repair on 5/14/19 - repair site looks healthy  · Immigrant from [place]        Discussion:      I have seen the patient from IV Zosyn to IV meropenem yesterday. He seems to be tolerating antibiotics okay. No CBC done today. Nasal MRSA PCR is pending.     Blood cultures and urine cultures are negative so far    Plan:     Diagnostic Workup:    · Will order daily CBC and BMP for now  · Continue to follow  fever curve, WBC count and blood cultures  · Follow up on liver and renal function  · Will order repeat u/A with reflex to culture  · Check any additional questions, please do not hesitate to contact me. Subjective: Interval history: Patient was seen and examined at bedside. Interval history was obtained. The patient had high fever in morning depsite iv meropenem. Has some dry cough. Has left lower quadrant pain today     REVIEW OF SYSTEMS:      Review of Systems   Constitutional: Positive for fatigue and fever. Negative for chills, diaphoresis and unexpected weight change. HENT: Negative for congestion, ear discharge, ear pain, facial swelling, hearing loss, rhinorrhea and trouble swallowing. Eyes: Negative for photophobia, discharge, redness and visual disturbance. Respiratory: Negative for apnea, cough, choking, chest tightness, shortness of breath and stridor. Cardiovascular: Negative for chest pain and palpitations. Gastrointestinal: Positive for abdominal pain (left lower quadrant). Negative for blood in stool, diarrhea and nausea. Endocrine: Negative for polydipsia, polyphagia and polyuria. Genitourinary: Negative for difficulty urinating, dysuria, frequency, hematuria, menstrual problem and vaginal discharge. Musculoskeletal: Negative for arthralgias, joint swelling, myalgias and neck stiffness. Skin: Negative for color change and rash. Allergic/Immunologic: Negative for immunocompromised state. Neurological: Negative for dizziness, seizures, speech difficulty, light-headedness and headaches. Hematological: Negative for adenopathy. Psychiatric/Behavioral: Negative for agitation, hallucinations and suicidal ideas. Past Medical History: All past medical history reviewed today. Past Medical History:   Diagnosis Date    Anemia     taking iron       Past Surgical History: All past surgical history was reviewed today. History reviewed. No pertinent surgical history. Immunization History: All immunization history was reviewed by me today.     There is no immunization history for the selected administration types on file for this patient. Family History: All family history was reviewed today. History reviewed. No pertinent family history. Objective:       PHYSICAL EXAM:      Vitals:   Vitals:    05/19/19 0130 05/19/19 0831 05/19/19 1009 05/19/19 1215   BP: 110/67 111/74  109/72   Pulse: 73 94  72   Resp: 18 18  18   Temp: 98.3 °F (36.8 °C) 101.9 °F (38.8 °C) 98.6 °F (37 °C) 98 °F (36.7 °C)   TempSrc: Oral Oral Oral Oral   SpO2:       Weight:       Height:           Physical Exam   Constitutional: She is oriented to person, place, and time. She appears well-developed. No distress. HENT:   Head: Normocephalic. Right Ear: External ear normal.   Left Ear: External ear normal.   Nose: Nose normal.   Mouth/Throat: Oropharynx is clear and moist.   Eyes: Pupils are equal, round, and reactive to light. Conjunctivae are normal. Right eye exhibits no discharge. Left eye exhibits no discharge. No scleral icterus. Neck: Normal range of motion. Neck supple. Cardiovascular: Normal rate and regular rhythm. Exam reveals no friction rub. No murmur heard. Pulmonary/Chest: Effort normal. No stridor. She has no wheezes. She has no rales. She exhibits no tenderness. Abdominal: Soft. She exhibits no mass. There is tenderness (left lower quadrant, mild to moderate). There is no rebound and no guarding. Genitourinary:   Genitourinary Comments: Perineal suture site examined in presence of female RN after obtain verbal consent from patient. Site looks healthy. No obvious redness or purulence   Musculoskeletal: She exhibits no edema or tenderness. Lymphadenopathy:     She has no cervical adenopathy. Neurological: She is alert and oriented to person, place, and time. She exhibits normal muscle tone. Skin: Skin is warm and dry. No rash noted. She is not diaphoretic. No erythema. Psychiatric: She has a normal mood and affect. Judgment normal.   Nursing note and vitals reviewed. Lines:  All vascular access sites are healthy with no local erythema, discharge or tenderness. Intake and output:    I/O last 3 completed shifts: In: 15 [I.V.:15]  Out: -     Lab Data:   All available labs and old records have been reviewed by me. CBC:  Recent Labs     05/17/19  0554 05/18/19  0600   WBC 14.4* 11.9*   RBC 3.88* 3.70*   HGB 10.3* 9.8*   HCT 31.6* 30.6*    291   MCV 81.5 82.8   MCH 26.5 26.5   MCHC 32.5 32.1   RDW 14.8 15.0        BMP:  Recent Labs     05/17/19  1120   CREATININE <0.5*        Hepatic Function Panel: No results found for: ALKPHOS, ALT, AST, PROT, BILITOT, BILIDIR, IBILI, LABALBU    CPK: No results found for: CKTOTAL  ESR: No results found for: SEDRATE  CRP: No results found for: CRP        Imaging: All pertinent images and reports for the current visit were reviewed by me during this visit. CT CHEST PULMONARY EMBOLISM W CONTRAST   Final Result   No evidence of pulmonary embolism or acute pulmonary abnormality. CT ABDOMEN PELVIS W IV CONTRAST Additional Contrast? None   Final Result   Uterus is enlarged and heterogeneous. Heterogeneous fluid is seen in the   endometrial canal.  Findings are most likely due to the recent postpartum   state. No obvious gas in the endometrial canal      No hydronephrosis noted. No significant bladder wall thickening. 1 mm density right kidney either artifact or tiny stone             Medications: All current and past medications were reviewed.      meropenem  1 g Intravenous Q8H    lactobacillus  1 capsule Oral BID     sodium chloride flush  10 mL Intravenous 2 times per day    rho(D) immune globulin  300 mcg Intramuscular Once    docusate sodium  100 mg Oral BID    ferrous sulfate  325 mg Oral Daily    measles, mumps and rubella vaccine  0.5 mL Subcutaneous Prior to discharge    Tdap-Dtap  0.5 mL Intramuscular Prior to discharge        lactated ringers      oxytocin Stopped (05/14/19 0600)       sodium chloride flush, acetaminophen, ibuprofen, simethicone, magnesium hydroxide, sennosides-docusate sodium, ondansetron, ondansetron, famotidine, lanolin, witch hazel-glycerin, hydrocortisone, benzocaine-menthol, misoprostol, oxyCODONE    Current antibiotics: All antibiotics and their doses were reviewed by me today    Recent Abx Admin                   meropenem (MERREM) 1 g in sodium chloride 0.9 % 100 mL IVPB (mini-bag) (g) 1 g New Bag 05/19/19 1005     1 g New Bag  0154     1 g New Bag 05/18/19 1737                Known drug Allergies: All allergies were reviewed and updated    No Known Allergies        Please note that this chart was generated using Dragon dictation software. Although every effort was made to ensure the accuracy of this automated transcription, some errors in transcription may have occurred inadvertently. If you may need any clarification, please do not hesitate to contact me through EPIC or at the phone number provided below with my electronic signature.     Radha Arriaza MD, MPH  5/19/2019, 12:28 PM  [facility] Infectious Disease   Office: [phone]  Fax: [phone]  Tuesday AM clinic:   [address] [address]  Thursday AM [id number] [facility]

## 2019-05-19 NOTE — PROGRESS NOTES
[facility]    5/19/2019 10:50 AM        Name: Yadira Grier . Admitted: 5/12/2019  Primary Care Provider: No primary care provider on file. (Tel: None)                        Hospital course:  She delivered on 05/14 spontaneous vaginal delivery. The pt spiked fever . With Tmax 102.7 . Also this morning temperature raised to 101.9         Subjective:  She complaining headache when temperature raised    No acute events overnight. Resting well. Pain control. Diet ok. Labs reviewed  Denies any chest pain sob.      Reviewed interval ancillary notes    Current Medications    lactated ringers infusion Continuous   meropenem (MERREM) 1 g in sodium chloride 0.9 % 100 mL IVPB (mini-bag) Q8H   lactobacillus (CULTURELLE) capsule 1 capsule BID WC   sodium chloride flush 0.9 % injection 10 mL 2 times per day   sodium chloride flush 0.9 % injection 10 mL PRN   rho(D) immune globulin (HYPERRHO S/D) injection 300 mcg Once   acetaminophen (TYLENOL) tablet 1,000 mg Q6H PRN   ibuprofen (ADVIL;MOTRIN) tablet 800 mg Q8H PRN   simethicone (MYLICON) chewable tablet 80 mg Q6H PRN   docusate sodium (COLACE) capsule 100 mg BID   magnesium hydroxide (MILK OF MAGNESIA) 400 MG/5ML suspension 30 mL Daily PRN   sennosides-docusate sodium (SENOKOT-S) 8.6-50 MG tablet 1 tablet Daily PRN   ondansetron (ZOFRAN) injection 4 mg Q6H PRN   ondansetron (ZOFRAN-ODT) disintegrating tablet 4 mg Q6H PRN   famotidine (PEPCID) tablet 20 mg BID PRN   ferrous sulfate tablet 325 mg Daily   measles, mumps and rubella vaccine (MMR) injection 0.5 mL Prior to discharge   Tetanus-Diphth-Acell Pertussis (BOOSTRIX) injection 0.5 mL Prior to discharge   lanolin ointment PRN   witch hazel-glycerin (TUCKS) pad PRN   hydrocortisone 2.5 % cream Q2H PRN   benzocaine-menthol (DERMOPLAST) 20-0.5 % spray PRN   oxytocin (PITOCIN) 30 units in 500 mL infusion Continuous   misoprostol (CYTOTEC) tablet 800 mcg PRN   oxyCODONE (ROXICODONE) immediate release tablet 5 mg Q4H PRN       Objective:  /74   Pulse 94   Temp 98.6 °F (37 °C) (Oral)   Resp 18   Ht 5' 6\" (1.676 m)   Wt 158 lb (71.7 kg)   SpO2 98%   Breastfeeding? Unknown   BMI 25.50 kg/m²     Intake/Output Summary (Last 24 hours) at 5/19/2019 1050  Last data filed at 5/19/2019 0236  Gross per 24 hour   Intake 15 ml   Output --   Net 15 ml    Wt Readings from Last 3 Encounters:   05/12/19 158 lb (71.7 kg)   05/04/19 155 lb (70.3 kg)   03/26/19 150 lb (68 kg)       General appearance:  Appears comfortable  Eyes: Sclera clear. Pupils equal.  ENT: Moist oral mucosa. Trachea midline, no adenopathy. Cardiovascular: Regular rhythm, normal S1, S2. No murmur. No edema in lower extremities  Respiratory: Not using accessory muscles. Good inspiratory effort. Clear to auscultation bilaterally, no wheeze or crackles. GI: Abdomen soft, no tenderness, not distended, normal bowel sounds  Musculoskeletal: No cyanosis in digits, neck supple  Neurology: CN 2-12 grossly intact. No speech or motor deficits  Psych: Normal affect. Alert and oriented in time, place and person  Skin: Warm, dry, normal turgor    Labs and Tests:  CBC:   Recent Labs     05/17/19  0554 05/18/19  0600   WBC 14.4* 11.9*   HGB 10.3* 9.8*    291     BMP:  Recent Labs     05/17/19  1120   CREATININE <0.5*         Problem List  Active Problems:    Threatened labor    Threatened labor at term    Sepsis ([facility] [address]    Puerperal pyrexia of unknown origin, delivered, with mention of postpartum complication    Normal spontaneous vaginal delivery    Anemia  Resolved Problems:    * No resolved hospital problems. *     Assessment & Plan:   1. Febrile illness post partum Repeat blood cultures x 2  CT abdomen pelvis is unremarkable just showing postpartum changes  UA is normal , blood, urine culture pending.  CT chest showed

## 2019-05-20 LAB
A/G RATIO: 0.9 (ref 1.1–2.2)
ALBUMIN SERPL-MCNC: 2.9 G/DL (ref 3.4–5)
ALP BLD-CCNC: 123 U/L (ref 40–129)
ALT SERPL-CCNC: 14 U/L (ref 10–40)
ANION GAP SERPL CALCULATED.3IONS-SCNC: 9 MMOL/L (ref 3–16)
AST SERPL-CCNC: 18 U/L (ref 15–37)
BASOPHILS ABSOLUTE: 0 K/UL (ref 0–0.2)
BASOPHILS RELATIVE PERCENT: 0 %
BILIRUB SERPL-MCNC: <0.2 MG/DL (ref 0–1)
BUN BLDV-MCNC: 6 MG/DL (ref 7–20)
CALCIUM SERPL-MCNC: 8.6 MG/DL (ref 8.3–10.6)
CHLORIDE BLD-SCNC: 107 MMOL/L (ref 99–110)
CO2: 22 MMOL/L (ref 21–32)
CREAT SERPL-MCNC: 0.6 MG/DL (ref 0.6–1.1)
EOSINOPHILS ABSOLUTE: 0.1 K/UL (ref 0–0.6)
EOSINOPHILS RELATIVE PERCENT: 1 %
GFR AFRICAN AMERICAN: >60
GFR NON-AFRICAN AMERICAN: >60
GLOBULIN: 3.4 G/DL
GLUCOSE BLD-MCNC: 113 MG/DL (ref 70–99)
HCT VFR BLD CALC: 31.5 % (ref 36–48)
HEMOGLOBIN: 10.4 G/DL (ref 12–16)
LYMPHOCYTES ABSOLUTE: 1.2 K/UL (ref 1–5.1)
LYMPHOCYTES RELATIVE PERCENT: 11 %
MCH RBC QN AUTO: 26.9 PG (ref 26–34)
MCHC RBC AUTO-ENTMCNC: 32.8 G/DL (ref 31–36)
MCV RBC AUTO: 82.1 FL (ref 80–100)
METAMYELOCYTES RELATIVE PERCENT: 1 %
MONOCYTES ABSOLUTE: 0.9 K/UL (ref 0–1.3)
MONOCYTES RELATIVE PERCENT: 8 %
MYELOCYTE PERCENT: 2 %
NEUTROPHILS ABSOLUTE: 8.8 K/UL (ref 1.7–7.7)
NEUTROPHILS RELATIVE PERCENT: 77 %
PDW BLD-RTO: 15 % (ref 12.4–15.4)
PLATELET # BLD: 363 K/UL (ref 135–450)
PLATELET SLIDE REVIEW: ADEQUATE
PMV BLD AUTO: 7.5 FL (ref 5–10.5)
POTASSIUM SERPL-SCNC: 3.5 MMOL/L (ref 3.5–5.1)
PROCALCITONIN: 0.06 NG/ML (ref 0–0.15)
RBC # BLD: 3.84 M/UL (ref 4–5.2)
RBC # BLD: NORMAL 10*6/UL
SLIDE REVIEW: ABNORMAL
SMUDGE CELLS: PRESENT
SODIUM BLD-SCNC: 138 MMOL/L (ref 136–145)
TOTAL PROTEIN: 6.3 G/DL (ref 6.4–8.2)
TOXIC GRANULATION: PRESENT
WBC # BLD: 11 K/UL (ref 4–11)

## 2019-05-20 PROCEDURE — 85025 COMPLETE CBC W/AUTO DIFF WBC: CPT

## 2019-05-20 PROCEDURE — 2580000003 HC RX 258: Performed by: INTERNAL MEDICINE

## 2019-05-20 PROCEDURE — 6360000002 HC RX W HCPCS: Performed by: INTERNAL MEDICINE

## 2019-05-20 PROCEDURE — 80053 COMPREHEN METABOLIC PANEL: CPT

## 2019-05-20 PROCEDURE — 2580000003 HC RX 258: Performed by: OBSTETRICS & GYNECOLOGY

## 2019-05-20 PROCEDURE — 6370000000 HC RX 637 (ALT 250 FOR IP): Performed by: INTERNAL MEDICINE

## 2019-05-20 PROCEDURE — 6370000000 HC RX 637 (ALT 250 FOR IP): Performed by: OBSTETRICS & GYNECOLOGY

## 2019-05-20 PROCEDURE — 99232 SBSQ HOSP IP/OBS MODERATE 35: CPT | Performed by: INTERNAL MEDICINE

## 2019-05-20 PROCEDURE — 1220000000 HC SEMI PRIVATE OB R&B

## 2019-05-20 RX ADMIN — SODIUM CHLORIDE 1 G: 900 INJECTION INTRAVENOUS at 01:38

## 2019-05-20 RX ADMIN — SODIUM CHLORIDE 1 G: 900 INJECTION INTRAVENOUS at 09:57

## 2019-05-20 RX ADMIN — FERROUS SULFATE TAB 325 MG (65 MG ELEMENTAL FE) 325 MG: 325 (65 FE) TAB at 09:57

## 2019-05-20 RX ADMIN — LINEZOLID 600 MG: 600 INJECTION, SOLUTION INTRAVENOUS at 00:30

## 2019-05-20 RX ADMIN — SODIUM CHLORIDE, POTASSIUM CHLORIDE, SODIUM LACTATE AND CALCIUM CHLORIDE: 600; 310; 30; 20 INJECTION, SOLUTION INTRAVENOUS at 03:59

## 2019-05-20 RX ADMIN — IBUPROFEN 800 MG: 800 TABLET, FILM COATED ORAL at 12:18

## 2019-05-20 RX ADMIN — Medication 1 CAPSULE: at 12:09

## 2019-05-20 RX ADMIN — LINEZOLID 600 MG: 600 INJECTION, SOLUTION INTRAVENOUS at 14:20

## 2019-05-20 RX ADMIN — ACETAMINOPHEN 1000 MG: 500 TABLET, FILM COATED ORAL at 00:29

## 2019-05-20 ASSESSMENT — ENCOUNTER SYMPTOMS
TROUBLE SWALLOWING: 0
SHORTNESS OF BREATH: 0
APNEA: 0
NAUSEA: 0
PHOTOPHOBIA: 0
CHEST TIGHTNESS: 0
BLOOD IN STOOL: 0
EYE DISCHARGE: 0
FACIAL SWELLING: 0
EYE REDNESS: 0
COUGH: 0
COLOR CHANGE: 0
CHOKING: 0
ABDOMINAL PAIN: 0
STRIDOR: 0
RHINORRHEA: 0
DIARRHEA: 1

## 2019-05-20 ASSESSMENT — PAIN SCALES - GENERAL
PAINLEVEL_OUTOF10: 4
PAINLEVEL_OUTOF10: 3

## 2019-05-20 NOTE — PROGRESS NOTES
Department of Obstetrics and Gynecology  Labor and Delivery  Attending Post Partum Progress Note      SUBJECTIVE:  No fever or chills, feels better. OBJECTIVE:      Vitals:  /67   Pulse 70   Temp 97.9 °F (36.6 °C) (Oral)   Resp 16   Ht 5' 6\" (1.676 m)   Wt 158 lb (71.7 kg)   SpO2 98%   Breastfeeding? Unknown   BMI 25.50 kg/m²     CONSTITUTIONAL:  awake, alert, cooperative, no apparent distress, and appears stated age    DATA:    CBC:    Lab Results   Component Value Date    WBC 11.0 05/20/2019    RBC 3.84 05/20/2019    HGB 10.4 05/20/2019    HCT 31.5 05/20/2019    MCV 82.1 05/20/2019    RDW 15.0 05/20/2019     05/20/2019     Other labs reviewed in chart. ASSESSMENT & PLAN:        Sepsis ([facility] [address] ()    Puerperal pyrexia of unknown origin, delivered, with mention of postpartum complication ()    Anemia ()    High fever ()      Plan per ID re continuation of present care.

## 2019-05-20 NOTE — PROGRESS NOTES
[facility] PROGRESS NOTE    5/20/2019 5:34 PM        Name: Madai Chiu . Admitted: 5/12/2019  Primary Care Provider: No primary care provider on file. (Tel: None)    Brief Course:       CC: Fever    Subjective:  . Patient feels better  Wants to go home  Mother and baby at bedside   Reviewed interval ancillary notes    Current Medications    lactated ringers infusion Continuous   linezolid (ZYVOX) IVPB 600 mg Q12H   lactobacillus (CULTURELLE) capsule 1 capsule BID WC   sodium chloride flush 0.9 % injection 10 mL 2 times per day   sodium chloride flush 0.9 % injection 10 mL PRN   rho(D) immune globulin (HYPERRHO S/D) injection 300 mcg Once   acetaminophen (TYLENOL) tablet 1,000 mg Q6H PRN   ibuprofen (ADVIL;MOTRIN) tablet 800 mg Q8H PRN   simethicone (MYLICON) chewable tablet 80 mg Q6H PRN   docusate sodium (COLACE) capsule 100 mg BID   magnesium hydroxide (MILK OF MAGNESIA) 400 MG/5ML suspension 30 mL Daily PRN   sennosides-docusate sodium (SENOKOT-S) 8.6-50 MG tablet 1 tablet Daily PRN   ondansetron (ZOFRAN) injection 4 mg Q6H PRN   ondansetron (ZOFRAN-ODT) disintegrating tablet 4 mg Q6H PRN   famotidine (PEPCID) tablet 20 mg BID PRN   ferrous sulfate tablet 325 mg Daily   measles, mumps and rubella vaccine (MMR) injection 0.5 mL Prior to discharge   Tetanus-Diphth-Acell Pertussis (BOOSTRIX) injection 0.5 mL Prior to discharge   lanolin ointment PRN   witch hazel-glycerin (TUCKS) pad PRN   hydrocortisone 2.5 % cream Q2H PRN   benzocaine-menthol (DERMOPLAST) 20-0.5 % spray PRN   oxytocin (PITOCIN) 30 units in 500 mL infusion Continuous   misoprostol (CYTOTEC) tablet 800 mcg PRN   oxyCODONE (ROXICODONE) immediate release tablet 5 mg Q4H PRN       Objective:  /83   Pulse 77   Temp 98.8 °F (37.1 °C) (Oral)   Resp 16   Ht 5' 6\" (1.676 m)   Wt 158 lb (71.7 kg)   SpO2 98%   Breastfeeding?  Unknown   BMI 25.50 kg/m²     Intake/Output Summary (Last 24 hours) at 5/20/2019 1734  Last data filed at 5/20/2019 1027  Gross per 24 hour   Intake 1000 ml   Output --   Net 1000 ml    Wt Readings from Last 3 Encounters:   05/12/19 158 lb (71.7 kg)   05/04/19 155 lb (70.3 kg)   03/26/19 150 lb (68 kg)       General appearance:  Appears comfortable  Eyes: Sclera clear. Pupils equal.  ENT: Moist oral mucosa. Trachea midline, no adenopathy. Cardiovascular: Regular rhythm, normal S1, S2. No murmur. No edema in lower extremities  Respiratory: Not using accessory muscles. Good inspiratory effort. Clear to auscultation bilaterally, no wheeze or crackles. GI: Abdomen soft, no tenderness, not distended, normal bowel sounds  Musculoskeletal: No cyanosis in digits, neck supple  Neurology: CN 2-12 grossly intact. No speech or motor deficits  Psych: Normal affect. Alert and oriented in time, place and person  Skin: Warm, dry, normal turgor  Extremity exam shows brisk capillary refill. Peripheral pulses are palpable in lower extremities     Labs and Tests:  CBC:   Recent Labs     05/18/19  0600 05/20/19  0500   WBC 11.9* 11.0   HGB 9.8* 10.4*    363     BMP:  Recent Labs     05/20/19  0500      K 3.5      CO2 22   BUN 6*   CREATININE 0.6   GLUCOSE 113*     Hepatic: Recent Labs     05/20/19  0500   AST 18   ALT 14   BILITOT <0.2   ALKPHOS 123     CT CHEST PULMONARY EMBOLISM W CONTRAST   Final Result   No evidence of pulmonary embolism or acute pulmonary abnormality. CT ABDOMEN PELVIS W IV CONTRAST Additional Contrast? None   Final Result   Uterus is enlarged and heterogeneous. Heterogeneous fluid is seen in the   endometrial canal.  Findings are most likely due to the recent postpartum   state. No obvious gas in the endometrial canal      No hydronephrosis noted. No significant bladder wall thickening.       1 mm density right kidney either artifact or tiny stone           Problem List  Active Problems:    Threatened labor    Threatened labor at term    Sepsis [facility])    Puerperal pyrexia of unknown origin, delivered, with mention of postpartum complication    Normal spontaneous vaginal delivery    Anemia    High fever  Resolved Problems:    * No resolved hospital problems.  *       Assessment & Plan:   Post partum febrile illness   Culture negative, on zyvox  ID managing abx,   Discharge once ok with ID    IV Access: peripheral  [place]:  Diet: DIET GENERAL;  Code:Full Code  DVT PPX SCD  Disposition Hopefully home tomorrow, if continues to be afebril      Juan Nguyễn MD   5/20/2019 5:34 PM

## 2019-05-21 VITALS
WEIGHT: 158 LBS | HEART RATE: 86 BPM | DIASTOLIC BLOOD PRESSURE: 69 MMHG | SYSTOLIC BLOOD PRESSURE: 114 MMHG | RESPIRATION RATE: 18 BRPM | HEIGHT: 66 IN | OXYGEN SATURATION: 98 % | TEMPERATURE: 98.6 F | BODY MASS INDEX: 25.39 KG/M2

## 2019-05-21 LAB
A/G RATIO: 1 (ref 1.1–2.2)
ALBUMIN SERPL-MCNC: 3.1 G/DL (ref 3.4–5)
ALP BLD-CCNC: 129 U/L (ref 40–129)
ALT SERPL-CCNC: 18 U/L (ref 10–40)
ANION GAP SERPL CALCULATED.3IONS-SCNC: 14 MMOL/L (ref 3–16)
AST SERPL-CCNC: 24 U/L (ref 15–37)
BASOPHILS ABSOLUTE: 0 K/UL (ref 0–0.2)
BASOPHILS RELATIVE PERCENT: 0 %
BILIRUB SERPL-MCNC: <0.2 MG/DL (ref 0–1)
BLOOD CULTURE, ROUTINE: NORMAL
BUN BLDV-MCNC: 6 MG/DL (ref 7–20)
CALCIUM SERPL-MCNC: 9.1 MG/DL (ref 8.3–10.6)
CHLORIDE BLD-SCNC: 102 MMOL/L (ref 99–110)
CO2: 22 MMOL/L (ref 21–32)
CREAT SERPL-MCNC: 0.6 MG/DL (ref 0.6–1.1)
EOSINOPHILS ABSOLUTE: 0.5 K/UL (ref 0–0.6)
EOSINOPHILS RELATIVE PERCENT: 4 %
GFR AFRICAN AMERICAN: >60
GFR NON-AFRICAN AMERICAN: >60
GLOBULIN: 3.2 G/DL
GLUCOSE BLD-MCNC: 103 MG/DL (ref 70–99)
HCT VFR BLD CALC: 34.5 % (ref 36–48)
HEMOGLOBIN: 11.1 G/DL (ref 12–16)
LYMPHOCYTES ABSOLUTE: 1.5 K/UL (ref 1–5.1)
LYMPHOCYTES RELATIVE PERCENT: 13 %
MCH RBC QN AUTO: 26.1 PG (ref 26–34)
MCHC RBC AUTO-ENTMCNC: 32.2 G/DL (ref 31–36)
MCV RBC AUTO: 81.3 FL (ref 80–100)
MONOCYTES ABSOLUTE: 0.5 K/UL (ref 0–1.3)
MONOCYTES RELATIVE PERCENT: 4 %
NEUTROPHILS ABSOLUTE: 8.9 K/UL (ref 1.7–7.7)
NEUTROPHILS RELATIVE PERCENT: 79 %
PDW BLD-RTO: 14.8 % (ref 12.4–15.4)
PLATELET # BLD: 428 K/UL (ref 135–450)
PLATELET SLIDE REVIEW: ABNORMAL
PMV BLD AUTO: 7.2 FL (ref 5–10.5)
POTASSIUM SERPL-SCNC: 3.7 MMOL/L (ref 3.5–5.1)
RBC # BLD: 4.25 M/UL (ref 4–5.2)
RBC # BLD: NORMAL 10*6/UL
SLIDE REVIEW: ABNORMAL
SODIUM BLD-SCNC: 138 MMOL/L (ref 136–145)
TOTAL PROTEIN: 6.3 G/DL (ref 6.4–8.2)
URINE CULTURE, ROUTINE: NORMAL
WBC # BLD: 11.3 K/UL (ref 4–11)

## 2019-05-21 PROCEDURE — 2580000003 HC RX 258: Performed by: OBSTETRICS & GYNECOLOGY

## 2019-05-21 PROCEDURE — 85025 COMPLETE CBC W/AUTO DIFF WBC: CPT

## 2019-05-21 PROCEDURE — 6370000000 HC RX 637 (ALT 250 FOR IP): Performed by: INTERNAL MEDICINE

## 2019-05-21 PROCEDURE — 80053 COMPREHEN METABOLIC PANEL: CPT

## 2019-05-21 PROCEDURE — 6360000002 HC RX W HCPCS: Performed by: INTERNAL MEDICINE

## 2019-05-21 PROCEDURE — 99232 SBSQ HOSP IP/OBS MODERATE 35: CPT | Performed by: INTERNAL MEDICINE

## 2019-05-21 PROCEDURE — 6370000000 HC RX 637 (ALT 250 FOR IP): Performed by: OBSTETRICS & GYNECOLOGY

## 2019-05-21 RX ORDER — LINEZOLID 600 MG/1
600 TABLET, FILM COATED ORAL 2 TIMES DAILY
Qty: 14 TABLET | Refills: 0 | Status: SHIPPED | OUTPATIENT
Start: 2019-05-21 | End: 2019-05-28

## 2019-05-21 RX ADMIN — Medication 10 ML: at 14:24

## 2019-05-21 RX ADMIN — LINEZOLID 600 MG: 600 INJECTION, SOLUTION INTRAVENOUS at 02:27

## 2019-05-21 RX ADMIN — LINEZOLID 600 MG: 600 INJECTION, SOLUTION INTRAVENOUS at 14:24

## 2019-05-21 RX ADMIN — FERROUS SULFATE TAB 325 MG (65 MG ELEMENTAL FE) 325 MG: 325 (65 FE) TAB at 08:55

## 2019-05-21 RX ADMIN — Medication 1 CAPSULE: at 09:54

## 2019-05-21 ASSESSMENT — ENCOUNTER SYMPTOMS
SHORTNESS OF BREATH: 0
COLOR CHANGE: 0
NAUSEA: 0
CHOKING: 0
PHOTOPHOBIA: 0
FACIAL SWELLING: 0
APNEA: 0
ABDOMINAL PAIN: 0
DIARRHEA: 0
EYE REDNESS: 0
EYE DISCHARGE: 0
COUGH: 0
TROUBLE SWALLOWING: 0
RHINORRHEA: 0
STRIDOR: 0
BLOOD IN STOOL: 0
CHEST TIGHTNESS: 0

## 2019-05-21 NOTE — DISCHARGE SUMMARY
[facility]    Patient Demographics    Gayle Hawk  Date of Birth. 1995  MRN. [id number]     Primary care provider. No primary care provider on file. (Tel: None)    Admit date: 5/12/2019    Discharge date (blank if same as Note Date): Note Date: 5/21/2019     Reason for Hospitalization. Chief Complaint   Patient presents with    Contractions         Significant Findings. Puerperal sepsis   Normal vaginal delivery     Problems and results from this hospitalization that need follow up. Significant test results and incidental findings. 1.   CT CHEST PULMONARY EMBOLISM W CONTRAST   Final Result   No evidence of pulmonary embolism or acute pulmonary abnormality. CT ABDOMEN PELVIS W IV CONTRAST Additional Contrast? None   Final Result   Uterus is enlarged and heterogeneous. Heterogeneous fluid is seen in the   endometrial canal.  Findings are most likely due to the recent postpartum   state. No obvious gas in the endometrial canal      No hydronephrosis noted. No significant bladder wall thickening. 1 mm density right kidney either artifact or tiny stone           Invasive procedures and treatments. [facility] Course. Post partum febrile illness   Etiology unclear. Extensive w/u including CTPE, ct abd/pelvis, normal pro calcitonin, ID consultation during the stay  Culture negative, on zyvox  ID managing abx,   Discharge on PO zyvox.          Consults. PHARMACY TO DOSE AMINOGLYCOSIDE  IP CONSULT TO HOSPITALIST  IP CONSULT TO INFECTIOUS DISEASES    Physical examination on discharge day. /69   Pulse 86   Temp 98.6 °F (37 °C) (Oral)   Resp 18   Ht 5' 6\" (1.676 m)   Wt 158 lb (71.7 kg)   SpO2 98%   Breastfeeding? Unknown   BMI 25.50 kg/m²   General appearance. Alert. Looks comfortable. HEENT. Sclera clear. Moist mucus membranes. Cardiovascular. Regular rate and rhythm, normal S1, S2. No murmur.

## 2019-05-21 NOTE — PROGRESS NOTES
[facility] post-partum note    Post-partum Day #7    Subjective:  Pain is : None  Lochia: staining only  Nausea: None  Bowel Movement/Flatus:  yes  Breastfeeding: plans to breastfeed    Objective:  Patient Vitals for the past 24 hrs:   BP Temp Temp src Pulse Resp   05/21/19 0410 117/73 99.3 °F (37.4 °C) Oral 98 16   05/20/19 2010 119/76 97.8 °F (36.6 °C) Oral 85 16   05/20/19 1615 -- 98.8 °F (37.1 °C) Oral -- --   05/20/19 1210 122/83 99.3 °F (37.4 °C) Oral 77 16   05/20/19 0820 -- 97.9 °F (36.6 °C) Oral -- --      Abdominal exam: soft, nontender, nondistended, no masses or organomegaly.      CV RRR  pulm CTA    Lab Results   Component Value Date    WBC 11.3 (H) 05/21/2019    HGB 11.1 (L) 05/21/2019    HCT 34.5 (L) 05/21/2019    MCV 81.3 05/21/2019     05/21/2019          Current Facility-Administered Medications:     lactated ringers infusion, , Intravenous, Continuous, Jai Haque MD, Last Rate: 100 mL/hr at 05/20/19 1420    linezolid (ZYVOX) IVPB 600 mg, 600 mg, Intravenous, Q12H, Bc Schneider MD, Stopped at 05/21/19 0327    lactobacillus (CULTURELLE) capsule 1 capsule, 1 capsule, Oral, BID WC, Bc Schneider MD, 1 capsule at 05/20/19 1209    sodium chloride flush 0.9 % injection 10 mL, 10 mL, Intravenous, 2 times per day, Elizabeth Coe MD, 10 mL at 05/18/19 2200    sodium chloride flush 0.9 % injection 10 mL, 10 mL, Intravenous, PRN, Elizabeth Coe MD, 10 mL at 05/19/19 0236    rho(D) immune globulin (HYPERRHO S/D) injection 300 mcg, 300 mcg, Intramuscular, Once, Elizabeth Coe MD    acetaminophen (TYLENOL) tablet 1,000 mg, 1,000 mg, Oral, Q6H PRN, Elizabeth Coe MD, 1,000 mg at 05/20/19 0029    ibuprofen (ADVIL;MOTRIN) tablet 800 mg, 800 mg, Oral, Q8H PRN, Elizabeth Coe MD, 800 mg at 05/20/19 1218    simethicone (MYLICON) chewable tablet 80 mg, 80 mg, Oral, Q6H PRN, Elizabeth Coe MD    docusate sodium (COLACE) capsule 100 mg, 100 mg, Oral, BID, Elizabeth Coe MD, Stopped at 05/20/19 0957    magnesium hydroxide (MILK OF MAGNESIA) 400 MG/5ML suspension 30 mL, 30 mL, Oral, Daily PRN, Blas Apley, MD    sennosides-docusate sodium (SENOKOT-S) 8.6-50 MG tablet 1 tablet, 1 tablet, Oral, Daily PRN, Blas Apley, MD    ondansetron [facility]) injection 4 mg, 4 mg, Intravenous, Q6H PRN, Blas Apley, MD    ondansetron (ZOFRAN-ODT) disintegrating tablet 4 mg, 4 mg, Oral, Q6H PRN, Blas Apley, MD    famotidine (PEPCID) tablet 20 mg, 20 mg, Oral, BID PRN, Blas Apley, MD    ferrous sulfate tablet 325 mg, 325 mg, Oral, Daily, Blas Apley, MD, 325 mg at 05/20/19 0957    measles, mumps and rubella vaccine (MMR) injection 0.5 mL, 0.5 mL, Subcutaneous, Prior to discharge, Blas Apley, MD    Tetanus-Diphth-Acell Pertussis (BOOSTRIX) injection 0.5 mL, 0.5 mL, Intramuscular, Prior to discharge, Blas Apley, MD    lanolin ointment, , Topical, PRN, Blas Apley, MD    witch hazel-glycerin (TUCKS) pad, , Topical, PRN, Blas Apley, MD    hydrocortisone 2.5 % cream, , Topical, Q2H PRN, Blas Apley, MD    benzocaine-menthol (DERMOPLAST) 20-0.5 % spray, , Topical, PRN, Blas Apley, MD    oxytocin (PITOCIN) 30 units in 500 mL infusion, 1 johnathan-units/min, Intravenous, Continuous, Blas Apley, MD, Stopped at 05/14/19 0600    misoprostol (CYTOTEC) tablet 800 mcg, 800 mcg, Rectal, PRN, Blas Apley, MD    oxyCODONE (ROXICODONE) immediate release tablet 5 mg, 5 mg, Oral, Q4H PRN, Blas Apley, MD     Assessment/Plan:      Continue Postpartum care  Discharge today: pending ID  Follow up: 1 weeks    Afebrile overnight, WBC trending down, no significant vaginal drainage. possible discharge home today.

## 2019-05-21 NOTE — PROGRESS NOTES
Infectious Diseases   Progress Note      Admission Date: 5/12/2019  Hospital Day: Hospital Day: 10  Attending: Jesenia Car MD  Date of service: 5/21/2019    Presenting complaint:   Chief Complaint   Patient presents with    Contractions       Chief complaint/ Reason for consult: The patient was seen today for the following:    · Sepsis with high-grade fever and leukocytosis   · Postpartum fever  · Anemia    Microbiology:        I have reviewed all available micro lab data and cultures    · Blood culture (2/2) - collected on 5/16/19 and 5/17/19: Negative  · Urine culture  - collected on 5/16/2019: Negative        Problem list:       Patient Active Problem List   Diagnosis Code    Threatened labor O47.9    Threatened labor at term O50.10    Sepsis ([address] A41.9    Puerperal pyrexia of unknown origin, delivered, with mention of postpartum complication N88.4    Normal spontaneous vaginal delivery O80    Anemia D64.9    High fever R50.9         Assessment:     The patient is a 20 y.o. old female who  has a past medical history of Anemia. with following problems:    · Sepsis with high-grade fever and leukocytosis  -improved  · Fever of unknown origin - improving to Zosyn  · Left lower quadrant tenderness - improved  · Postpartum fever - ongoing  · Anemia  · Status post normal vaginal delivery on 5/14/19 of a live baby boy  · Status post second-degree laceration repair on 5/14/19 - repair site has been healthy  · Immigrant from [place]        Discussion:      I had stopped her IV meropenem yesterday due to diarrhea issues. She is currently on IV linezolid 600 mg every 12 hours. She had a low-grade temp of 99.3 earlier today. W BC count 11,300    Repeat urine culture from 5/19/19 is also running negative. Serum creatinine is 0.6. Platelet count is 825,317.     Plan:     Diagnostic Workup:    · Continue to follow  fever curve, WBC count and blood cultures  · Follow up on liver and renal function    Antimicrobials:    · Will plan to switch IV linezolid to oral linezolid 600 mg every 12 hour discharge  · If she stays afebrile, we will recommend 1 more week of linezolid  · Would be avoidable to avoid breast-feeding while on linezolid, if okay with patient and OB/pediatrics team  · Cough and deep breathing exercises  · Discussed all above with patient and RN  · Discussed with Dr. Courtney De La Rosa      Drug Monitoring:    · Continue monitoring for antibiotic toxicity as follows: CBC, CMP  · Continue to watch for following: new or worsening fever, new hypotension, hives, lip swelling and redness or purulence at vascular access sites. I/v access Management:    · Continue to monitor i.v access sites for erythema, induration, discharge or tenderness. · As always, continue efforts to minimize tubes/lines/drains as clinically appropriate to reduce chances of line associated infections. Patient education and counseling:        · The patient was educated in detail about the side-effects of various antibiotics and things to watch for like new rashes, lip swelling, severe reaction, worsening diarrhea, break through fever etc.  · Discussed patient's condition and what to expect. All of the patient's questions were addressed in a satisfactory manner and patient verbalized understanding all instructions. TIME SPENT TODAY:     - Spent over  26 minutes on visit (including interval history, physical exam, review of data including labs, cultures, imaging, development and implementation of treatment plan and coordination of complex care). - Over 50% of time spent with patient face to face on counseling and education. Thank you for involving me in the care of your patient. I will continue to follow. If you have anyadditional questions, please do not hesitate to contact me. Subjective: Interval history: Patient was seen and examined at bedside. Interval history was obtained.    She remains on linezolid alone. Having only low-grade fever. Tolerating antibiotics okay. REVIEW OF SYSTEMS:      Review of Systems   Constitutional: Positive for fatigue. Negative for chills, diaphoresis and unexpected weight change. HENT: Negative for congestion, ear discharge, ear pain, facial swelling, hearing loss, rhinorrhea and trouble swallowing. Eyes: Negative for photophobia, discharge, redness and visual disturbance. Respiratory: Negative for apnea, cough, choking, chest tightness, shortness of breath and stridor. Cardiovascular: Negative for chest pain and palpitations. Gastrointestinal: Negative for abdominal pain, blood in stool, diarrhea and nausea. Endocrine: Negative for polydipsia, polyphagia and polyuria. Genitourinary: Negative for difficulty urinating, dysuria, frequency, hematuria, menstrual problem and vaginal discharge. Musculoskeletal: Negative for arthralgias, joint swelling, myalgias and neck stiffness. Skin: Negative for color change and rash. Allergic/Immunologic: Negative for immunocompromised state. Neurological: Negative for dizziness, seizures, speech difficulty, light-headedness and headaches. Hematological: Negative for adenopathy. Psychiatric/Behavioral: Negative for agitation, hallucinations and suicidal ideas. Past Medical History: All past medical history reviewed today. Past Medical History:   Diagnosis Date    Anemia     taking iron       Past Surgical History: All past surgical history was reviewed today. History reviewed. No pertinent surgical history. Immunization History: All immunization history was reviewed by me today. There is no immunization history for the selected administration types on file for this patient. Family History: All family history was reviewed today. History reviewed. No pertinent family history.     Objective:       PHYSICAL EXAM:      Vitals:   Vitals:    05/20/19 1615 05/20/19 2010 05/21/19 0410 05/21/19 9235 BP:  119/76 117/73 114/69   Pulse:  85 98 86   Resp:  16 16 18   Temp: 98.8 °F (37.1 °C) 97.8 °F (36.6 °C) 99.3 °F (37.4 °C) 98.6 °F (37 °C)   TempSrc: Oral Oral Oral Oral   SpO2:       Weight:       Height:           Physical Exam   Constitutional: She is oriented to person, place, and time. She appears well-developed. No distress. HENT:   Head: Normocephalic. Right Ear: External ear normal.   Left Ear: External ear normal.   Nose: Nose normal.   Mouth/Throat: Oropharynx is clear and moist.   Eyes: Pupils are equal, round, and reactive to light. Conjunctivae are normal. Right eye exhibits no discharge. Left eye exhibits no discharge. No scleral icterus. Neck: Normal range of motion. Neck supple. Cardiovascular: Normal rate and regular rhythm. Exam reveals no friction rub. No murmur heard. Pulmonary/Chest: Effort normal. No stridor. She has no wheezes. She has no rales. She exhibits no tenderness. Abdominal: Soft. She exhibits no mass. There is no tenderness. There is no rebound and no guarding. Musculoskeletal: She exhibits no edema or tenderness. Lymphadenopathy:     She has no cervical adenopathy. Neurological: She is alert and oriented to person, place, and time. She exhibits normal muscle tone. Skin: Skin is warm and dry. No rash noted. She is not diaphoretic. No erythema. Psychiatric: She has a normal mood and affect. Judgment normal.   Nursing note and vitals reviewed. Lines: All vascular access sites are healthy with no local erythema, discharge or tenderness. Intake and output:    I/O last 3 completed shifts: In: 10 [I.V.:10]  Out: -     Lab Data:   All available labs and old records have been reviewed by me.     CBC:  Recent Labs     05/20/19  0500 05/21/19  0610   WBC 11.0 11.3*   RBC 3.84* 4.25   HGB 10.4* 11.1*   HCT 31.5* 34.5*    428   MCV 82.1 81.3   MCH 26.9 26.1   MCHC 32.8 32.2   RDW 15.0 14.8        BMP:  Recent Labs     05/20/19  0500 05/21/19  0610   NA 138 138   K 3.5 3.7    102   CO2 22 22   BUN 6* 6*   CREATININE 0.6 0.6   CALCIUM 8.6 9.1   GLUCOSE 113* 103*        Hepatic Function Panel:   Lab Results   Component Value Date    ALKPHOS 129 05/21/2019    ALT 18 05/21/2019    AST 24 05/21/2019    PROT 6.3 05/21/2019    BILITOT <0.2 05/21/2019    LABALBU 3.1 05/21/2019       CPK: No results found for: CKTOTAL  ESR: No results found for: SEDRATE  CRP: No results found for: CRP        Imaging: All pertinent images and reports for the current visit were reviewed by me during this visit. CT CHEST PULMONARY EMBOLISM W CONTRAST   Final Result   No evidence of pulmonary embolism or acute pulmonary abnormality. CT ABDOMEN PELVIS W IV CONTRAST Additional Contrast? None   Final Result   Uterus is enlarged and heterogeneous. Heterogeneous fluid is seen in the   endometrial canal.  Findings are most likely due to the recent postpartum   state. No obvious gas in the endometrial canal      No hydronephrosis noted. No significant bladder wall thickening. 1 mm density right kidney either artifact or tiny stone             Medications: All current and past medications were reviewed.      linezolid  600 mg Intravenous Q12H    lactobacillus  1 capsule Oral BID WC    sodium chloride flush  10 mL Intravenous 2 times per day    rho(D) immune globulin  300 mcg Intramuscular Once    docusate sodium  100 mg Oral BID    ferrous sulfate  325 mg Oral Daily    measles, mumps and rubella vaccine  0.5 mL Subcutaneous Prior to discharge    Tdap-Dtap  0.5 mL Intramuscular Prior to discharge        lactated ringers 100 mL/hr at 05/20/19 1420    oxytocin Stopped (05/14/19 0600)       sodium chloride flush, acetaminophen, ibuprofen, simethicone, magnesium hydroxide, sennosides-docusate sodium, ondansetron, ondansetron, famotidine, lanolin, witch hazel-glycerin, hydrocortisone, benzocaine-menthol, misoprostol, oxyCODONE    Current antibiotics: All antibiotics and their doses were reviewed by me today    Recent Abx Admin                   linezolid (ZYVOX) IVPB 600 mg (mg) 600 mg New Bag 05/21/19 1424     600 mg New Bag  0227                Known drug Allergies: All allergies were reviewed and updated    No Known Allergies        Please note that this chart was generated using Dragon dictation software. Although every effort was made to ensure the accuracy of this automated transcription, some errors in transcription may have occurred inadvertently. If you may need any clarification, please do not hesitate to contact me through EPIC or at the phone number provided below with my electronic signature.     Brandi Reyes MD, MPH  5/21/2019, 3:02 PM  [facility] Infectious Disease   Office: [phone]  Fax: [phone]  Tuesday AM clinic:   [address] [facility] [address]  Thursday AM [id number] [place], [facility]

## 2019-05-21 NOTE — FLOWSHEET NOTE
Postpartum and infant care teaching completed and forms signed by patient. Copy witnessed by RN and given to patient. Patient verbalized understanding of all teaching points. Prescription filled by outpatient pharmacy and pt verbalized understanding of their use. Patient plans to follow-up with [facility] Provider as instructed. Patient verbalizes understanding of discharge instructions and denies further questions. ID bands checked. Mother's ID band and one of baby's ID bands removed and taped to footprint sheet, signed by patient and witnessed by RN. Patient discharged in stable condition accompanied by family/guardian. Discharged in wheelchair, holding baby in arms.

## 2019-05-22 LAB
BLOOD CULTURE, ROUTINE: NORMAL
CULTURE, BLOOD 2: NORMAL

## 2023-03-30 LAB
CHLAMYDIA TRACH., AMPLIFIED: NEGATIVE
N. GONORRHEA, AMPLIFIED: NEGATIVE
URINE CULTURE: NO GROWTH

## 2023-03-31 LAB
GRAM STAIN: NORMAL
TISSUE/WOUND CULTURE/SMEAR: NORMAL

## 2023-10-01 NOTE — PROGRESS NOTES
CLINICAL PHARMACY NOTE: MEDS TO [address] [email or web address] [facility] Patient?: No  Total # of Prescriptions Filled: 1   The following medications were delivered to the patient:  · linezolid 600mg  Total # of Interventions Completed: 1  Time Spent (min): 60    Additional Documentation:  Started PA process with Daphnie Felix in [place] at 2:06pm. Didn't hear back, teodoro fill Rx for patient. Medication delivered- patient signed.   [employer] CphT Vessel: RCA (ostium). Stent inserted. Inflation 1: Pressure = 12 megan; Duration = 12 sec. Inflation 2: Pressure = 12 megan; Duration = 22 sec. Inflation 3: Pressure = 14 megan; Duration = 10 sec.

## 2024-02-11 NOTE — PROCEDURES
Department of Obstetrics and Gynecology  Spontaneous Vaginal Delivery Note         Pre-operative Diagnosis:  Term pregnancy    Post-operative Diagnosis:  Same, delivered    Procedure:  Spontaneous vaginal delivery    Surgeon:  Erwin Young for the patient's :  Gui Beltran [[id number]]     Amrik Martini [[id number]]    Apgars    Living status:  Living  Apgars   1 Minute:   5 Minute:   10 Minute 15 Minute 20 Minute   Skin Color: 1  1       Heart Rate: 2  2       Reflex Irritability: 2  2       Muscle Tone: 2  2       Respiratory Effort: 2  2       Total: 9  9               [facility]pgars Assigned By:  Zoltan Dick RN            Information for the patient's :  Loree Srivastava [[id number]]   Birth Weight: 6 lb 11.2 oz (3.04 kg)      Anesthesia:  epidural anesthesia    Estimated blood loss:  300ml    Specimen:  Placenta not sent to pathology     Cord gas sent No    Complications:  none    Condition:  infant stable to general nursery and mother stable    Details of Procedure: The patient is a 20 y.o. female at 44w3d   OB History        1    Para        Term                AB        Living           SAB        TAB        Ectopic        Molar        Multiple        Live Births                 who was admitted for active phase labor. She received the following interventions: Lennox Garb The patient progressed well, did receive an epidural, became complete and started to push. After pushing  the fetal head was at the perineum, the nose and mouth suctioned with bulb suction and the rest of the infant delivered atraumatically, and was placed on the mother's abdomen. The cord was clamped and cut and infant handed off to the waiting nurse for evaluation. The delivery of the placenta was spontaneous. The perineum and vagina were explored and a second degree laceration was repaired in standard fashion. [facility] updated them on  and 5 units given.

## 2024-05-24 ENCOUNTER — OFFICE VISIT (OUTPATIENT)
Dept: ORTHOPEDIC SURGERY | Facility: CLINIC | Age: 29
End: 2024-05-24
Payer: MEDICAID

## 2024-05-24 ENCOUNTER — HOSPITAL ENCOUNTER (OUTPATIENT)
Dept: RADIOLOGY | Facility: CLINIC | Age: 29
Discharge: HOME | End: 2024-05-24
Payer: MEDICAID

## 2024-05-24 VITALS — HEIGHT: 66 IN | BODY MASS INDEX: 23.3 KG/M2 | WEIGHT: 145 LBS

## 2024-05-24 DIAGNOSIS — M79.672 HEEL PAIN, BILATERAL: ICD-10-CM

## 2024-05-24 DIAGNOSIS — M79.671 HEEL PAIN, BILATERAL: ICD-10-CM

## 2024-05-24 DIAGNOSIS — M76.61 RIGHT ACHILLES TENDINITIS: ICD-10-CM

## 2024-05-24 DIAGNOSIS — M72.2 PLANTAR FASCIITIS OF LEFT FOOT: Primary | ICD-10-CM

## 2024-05-24 PROCEDURE — 99204 OFFICE O/P NEW MOD 45 MIN: CPT | Performed by: EMERGENCY MEDICINE

## 2024-05-24 PROCEDURE — 73630 X-RAY EXAM OF FOOT: CPT | Mod: BILATERAL PROCEDURE | Performed by: RADIOLOGY

## 2024-05-24 PROCEDURE — 1036F TOBACCO NON-USER: CPT | Performed by: EMERGENCY MEDICINE

## 2024-05-24 PROCEDURE — 73630 X-RAY EXAM OF FOOT: CPT | Mod: 50

## 2024-05-24 RX ORDER — MELOXICAM 15 MG/1
15 TABLET ORAL DAILY
Qty: 30 TABLET | Refills: 0 | Status: SHIPPED | OUTPATIENT
Start: 2024-05-24 | End: 2024-06-23

## 2024-05-24 NOTE — PROGRESS NOTES
Subjective    Patient ID: Bess Mckeon is a 28 y.o. female.    Chief Complaint: Pain of the Left Foot (Heel pain - pain for month , denies any injury./Pain get worse throughout the day./Xr today /) and Pain of the Right Foot     Last Surgery: No surgery found  Last Surgery Date: No surgery found    Patient is a very pleasant 28-year-old female coming in with pain to the posterior aspect of her right heel near the insertion of her right Achilles tendon.  She denies any trauma.  Her pain is mild.  She also has pain in her left foot near the origin of her left plantar fascia.  She has been using ice and doing some stretching at home without much improvement.  She would like to avoid surgery if at all possible.  She has not done any rehab and is not currently taking any medications.  No similar prior episodes.  No reported trauma.  She has no infectious symptoms.  Her pain on both sides is worse with activity and better with rest.        Objective   Right Ankle Exam     Tenderness   Right ankle tenderness location: Mild to moderate tenderness palpation at the insertion of the right Achilles near the heel otherwise the foot is nontender including the plantar fascia.  Swelling: none    Range of Motion   The patient has normal right ankle ROM.    Muscle Strength   The patient has normal right ankle strength.    Tests   Anterior drawer: negative  Varus tilt: negative    Other   Erythema: absent  Sensation: normal  Pulse: present     Comments:  No laxity with stability testing of the right ankle.  Strength is intact with dorsiflexion and plantarflexion.  Cabrera squeeze testing is normal.      Left Ankle Exam     Tenderness   Left ankle tenderness location: Mild tenderness to palpation at the origin of the left plantar fascia near the heel otherwise the foot is nontender including the Achilles tendon.   Swelling: none    Muscle Strength   The patient has normal left ankle strength.    Tests   Anterior drawer: negative  Varus  tilt: negative    Other   Erythema: absent  Sensation: normal  Pulse: present            Image Results:  X-rays of both feet were reviewed and interpreted by me on 5/24/2024 and were grossly unremarkable without any evidence of acute injuries or fractures.    Assessment/Plan   Encounter Diagnoses:  Plantar fasciitis of left foot    Heel pain, bilateral    Right Achilles tendinitis    Orders Placed This Encounter    XR foot 3+ views bilateral    Referral to Physical Therapy    meloxicam (Mobic) 15 mg tablet     No follow-ups on file.      We discussed her treatment options and agreed for her to continue stretching. I provided her with a few more options including using a towel around both feet prior to getting out of bed.  She also agreed to purchase some gel heel cups to wear in her shoes.  I will send her for formal physical therapy with instructions to develop and implement a home exercise program and also prescribed her meloxicam to begin taking daily.  She will then follow-up with me in about 6 weeks to determine how she is responding to this plan.    ** Please excuse any errors in grammar or translation related to this dictation. Voice recognition software was utilized to prepare this document. **       Cj Bustamante MD  [facility] Sports Medicine

## 2024-07-05 ENCOUNTER — APPOINTMENT (OUTPATIENT)
Dept: ORTHOPEDIC SURGERY | Facility: CLINIC | Age: 29
End: 2024-07-05
Payer: MEDICAID

## 2024-07-09 ENCOUNTER — APPOINTMENT (OUTPATIENT)
Dept: ORTHOPEDIC SURGERY | Facility: CLINIC | Age: 29
End: 2024-07-09
Payer: MEDICAID

## 2024-07-12 ENCOUNTER — APPOINTMENT (OUTPATIENT)
Dept: PRIMARY CARE | Facility: CLINIC | Age: 29
End: 2024-07-12
Payer: MEDICAID

## 2024-07-12 VITALS
BODY MASS INDEX: 25.49 KG/M2 | TEMPERATURE: 97.8 F | WEIGHT: 153 LBS | HEIGHT: 65 IN | SYSTOLIC BLOOD PRESSURE: 112 MMHG | DIASTOLIC BLOOD PRESSURE: 70 MMHG | OXYGEN SATURATION: 98 % | HEART RATE: 81 BPM

## 2024-07-12 DIAGNOSIS — Z13.29 SCREENING FOR THYROID DISORDER: ICD-10-CM

## 2024-07-12 DIAGNOSIS — Z13.21 ENCOUNTER FOR VITAMIN DEFICIENCY SCREENING: ICD-10-CM

## 2024-07-12 DIAGNOSIS — Z01.419 WELL WOMAN EXAM: ICD-10-CM

## 2024-07-12 DIAGNOSIS — Z00.00 ROUTINE GENERAL MEDICAL EXAMINATION AT A HEALTH CARE FACILITY: Primary | ICD-10-CM

## 2024-07-12 PROCEDURE — 99385 PREV VISIT NEW AGE 18-39: CPT | Performed by: FAMILY MEDICINE

## 2024-07-12 PROCEDURE — 1036F TOBACCO NON-USER: CPT | Performed by: FAMILY MEDICINE

## 2024-07-12 ASSESSMENT — PATIENT HEALTH QUESTIONNAIRE - PHQ9
1. LITTLE INTEREST OR PLEASURE IN DOING THINGS: NOT AT ALL
2. FEELING DOWN, DEPRESSED OR HOPELESS: NOT AT ALL
2. FEELING DOWN, DEPRESSED OR HOPELESS: NOT AT ALL
1. LITTLE INTEREST OR PLEASURE IN DOING THINGS: NOT AT ALL
SUM OF ALL RESPONSES TO PHQ9 QUESTIONS 1 AND 2: 0
SUM OF ALL RESPONSES TO PHQ9 QUESTIONS 1 AND 2: 0

## 2024-07-12 NOTE — PROGRESS NOTES
"  Assessment     ASSESSMENT/PLAN:      Patient Instructions   Recommend monitoring diet and continue to stay active      Signed by: Gricelda Leon DO       FUTURE DIRECTION:   []    Subjective   SUBJECTIVE:     HPI : Patient is a 28 y.o. female who presents today for the following:       Plantar Fasciits  Has been following podiatry   Tried meloxciam with improvement       Review of Systems    Past Medical History:   Diagnosis Date    Other specified health status     No pertinent past medical history        Past Surgical History:   Procedure Laterality Date    OTHER SURGICAL HISTORY  05/12/2022    No history of surgery        No current outpatient medications     No Known Allergies     Social History     Socioeconomic History    Marital status:      Spouse name: Not on file    Number of children: Not on file    Years of education: Not on file    Highest education level: Not on file   Occupational History    Occupation:      Comment: formerly [employer]   Tobacco Use    Smoking status: Never    Smokeless tobacco: Never   Vaping Use    Vaping status: Never Used   Substance and Sexual Activity    Alcohol use: Not Currently     Comment: occasionally    Drug use: Never    Sexual activity: Yes     Partners: Male   Other Topics Concern    Not on file   Social History Narrative    Not on file     Social Determinants of Health     Financial Resource Strain: Not on file   Food Insecurity: Not on file   Transportation Needs: Not on file   Physical Activity: Not on file   Stress: Not on file   Social Connections: Not on file   Intimate Partner Violence: Not on file   Housing Stability: Not on file        Family History   Problem Relation Name Age of Onset    Diabetes type II Mother          Objective     OBJECTIVE:     Vitals:    07/12/24 1503   BP: 112/70   Pulse: 81   Temp: 36.6 °C (97.8 °F)   SpO2: 98%   Weight: 69.4 kg (153 lb)   Height: 1.651 m (5' 5\")        Physical Exam  HENT:      Head: Normocephalic and " atraumatic.      Right Ear: Tympanic membrane normal.      Left Ear: Tympanic membrane normal.      Nose: Nose normal.      Mouth/Throat:      Mouth: Mucous membranes are moist.      Pharynx: No posterior oropharyngeal erythema.   Eyes:      Extraocular Movements: Extraocular movements intact.      Pupils: Pupils are equal, round, and reactive to light.   Cardiovascular:      Rate and Rhythm: Normal rate and regular rhythm.      Pulses: Normal pulses.      Heart sounds: No murmur heard.  Pulmonary:      Effort: Pulmonary effort is normal.      Breath sounds: Normal breath sounds.   Abdominal:      Tenderness: There is no abdominal tenderness.   Musculoskeletal:         General: Normal range of motion.      Cervical back: Normal range of motion.      Right lower leg: No edema.      Left lower leg: No edema.   Skin:     General: Skin is warm and dry.   Neurological:      Mental Status: She is alert.   Psychiatric:         Mood and Affect: Mood normal.

## 2024-07-17 ENCOUNTER — APPOINTMENT (OUTPATIENT)
Dept: OBSTETRICS AND GYNECOLOGY | Facility: CLINIC | Age: 29
End: 2024-07-17
Payer: MEDICAID

## 2024-07-17 VITALS
HEIGHT: 65 IN | DIASTOLIC BLOOD PRESSURE: 72 MMHG | BODY MASS INDEX: 25.66 KG/M2 | SYSTOLIC BLOOD PRESSURE: 112 MMHG | WEIGHT: 154 LBS

## 2024-07-17 DIAGNOSIS — Z01.419 WELL WOMAN EXAM: ICD-10-CM

## 2024-07-17 DIAGNOSIS — Z01.419 WOMEN'S ANNUAL ROUTINE GYNECOLOGICAL EXAMINATION: Primary | ICD-10-CM

## 2024-07-17 PROCEDURE — 3008F BODY MASS INDEX DOCD: CPT | Performed by: OBSTETRICS & GYNECOLOGY

## 2024-07-17 PROCEDURE — 1036F TOBACCO NON-USER: CPT | Performed by: OBSTETRICS & GYNECOLOGY

## 2024-07-17 PROCEDURE — 99385 PREV VISIT NEW AGE 18-39: CPT | Performed by: OBSTETRICS & GYNECOLOGY

## 2024-07-17 NOTE — PROGRESS NOTES
"Bess Mckeon is a 28 y.o. female who presents with a chief complaint of Annual Exam      SUBJECTIVE  annual    Past Medical History:   Diagnosis Date    Other specified health status     No pertinent past medical history     Past Surgical History:   Procedure Laterality Date    OTHER SURGICAL HISTORY  05/12/2022    No history of surgery     Social History     Socioeconomic History    Marital status:    Occupational History    Occupation:      Comment: [employer]   Tobacco Use    Smoking status: Never    Smokeless tobacco: Never   Vaping Use    Vaping status: Never Used   Substance and Sexual Activity    Alcohol use: Not Currently     Comment: occasionally    Drug use: Never    Sexual activity: Yes     Partners: Male     Family History   Problem Relation Name Age of Onset    Diabetes type II Mother         OB History   No obstetric history on file.       OBJECTIVE  No Known Allergies   (Not in a hospital admission)       Review of Systems  History obtained from the patient  General ROS: negative  Psychological ROS: negative  Gastrointestinal ROS: negative  Musculoskeletal ROS: negative  Physical Exam  General Appearance: awake, alert, oriented, in no acute distress, well developed, well nourished, and in no acute distress  Skin: there are no suspicious lesions or rashes of concern  Head/Face: NCAT  Eyes: No gross abnormalities., PERRL, and EOMI  Neck: neck- supple, no mass, non-tender  Back: no pain to palpation  Breast: BREAST EXAM: normal  Abdomen: Soft, non-tender, normal bowel sounds; no bruits, organomegaly or masses.  Extremities: Extremities warm to touch, pink, with no edema.  Musculoskeletal: negative  Urogen: External genitalia: Normal, Vagina: Normal, Cervix: Normal, and Bimanual exam: Normal    /72   Ht 1.651 m (5' 5\")   Wt 69.9 kg (154 lb)   LMP 06/20/2024   BMI 25.63 kg/m²    Problem List Items Addressed This Visit    None  Visit Diagnoses       Women's annual routine gynecological " examination    -  Primary    Relevant Orders    THINPREP PAP    Well woman exam        Relevant Orders    THINPREP PAP

## 2024-07-25 LAB
CYTOLOGY CMNT CVX/VAG CYTO-IMP: NORMAL
LAB AP HPV GENOTYPE QUESTION: YES
LAB AP HPV HR: NORMAL
LABORATORY COMMENT REPORT: NORMAL
LMP START DATE: NORMAL
PATH REPORT.TOTAL CANCER: NORMAL

## 2024-07-30 ENCOUNTER — APPOINTMENT (OUTPATIENT)
Dept: ORTHOPEDIC SURGERY | Facility: CLINIC | Age: 29
End: 2024-07-30
Payer: MEDICAID

## 2024-07-30 ENCOUNTER — HOSPITAL ENCOUNTER (OUTPATIENT)
Dept: RADIOLOGY | Facility: EXTERNAL LOCATION | Age: 29
Discharge: HOME | End: 2024-07-30

## 2024-07-30 VITALS — WEIGHT: 154 LBS | HEIGHT: 66 IN | BODY MASS INDEX: 24.75 KG/M2

## 2024-07-30 DIAGNOSIS — M79.671 HEEL PAIN, BILATERAL: ICD-10-CM

## 2024-07-30 DIAGNOSIS — M79.672 HEEL PAIN, BILATERAL: ICD-10-CM

## 2024-07-30 DIAGNOSIS — M72.2 BILATERAL PLANTAR FASCIITIS: Primary | ICD-10-CM

## 2024-07-30 DIAGNOSIS — M72.2 PLANTAR FASCIITIS OF LEFT FOOT: ICD-10-CM

## 2024-07-30 PROCEDURE — 20551 NJX 1 TENDON ORIGIN/INSJ: CPT | Performed by: EMERGENCY MEDICINE

## 2024-07-30 PROCEDURE — 76942 ECHO GUIDE FOR BIOPSY: CPT | Performed by: EMERGENCY MEDICINE

## 2024-07-30 PROCEDURE — 3008F BODY MASS INDEX DOCD: CPT | Performed by: EMERGENCY MEDICINE

## 2024-07-30 PROCEDURE — 1036F TOBACCO NON-USER: CPT | Performed by: EMERGENCY MEDICINE

## 2024-07-30 PROCEDURE — 99214 OFFICE O/P EST MOD 30 MIN: CPT | Performed by: EMERGENCY MEDICINE

## 2024-07-30 RX ORDER — TRIAMCINOLONE ACETONIDE 40 MG/ML
40 INJECTION, SUSPENSION INTRA-ARTICULAR; INTRAMUSCULAR
Status: COMPLETED | OUTPATIENT
Start: 2024-07-30 | End: 2024-07-30

## 2024-07-30 RX ORDER — LIDOCAINE HYDROCHLORIDE 10 MG/ML
1 INJECTION INFILTRATION; PERINEURAL
Status: COMPLETED | OUTPATIENT
Start: 2024-07-30 | End: 2024-07-30

## 2024-07-30 ASSESSMENT — PAIN - FUNCTIONAL ASSESSMENT: PAIN_FUNCTIONAL_ASSESSMENT: NO/DENIES PAIN

## 2024-07-30 NOTE — PROGRESS NOTES
Subjective    Patient ID: Bess Mckeon is a 28 y.o. female.    Chief Complaint: Pain of the Left Foot (Bess Mckeon is coming in today for right achillies tendonitis and plantar fascitits of left foot. She was taking Meloxicam which gave good relief and using gel cups that gave some relief. She did not go to PT. /) and Pain of the Right Foot     Last Surgery: No surgery found  Last Surgery Date: No surgery found    Patient is a very pleasant 28-year-old female coming in with pain to the posterior aspect of her right heel near the insertion of her right Achilles tendon.  She denies any trauma.  Her pain is mild.  She also has pain in her left foot near the origin of her left plantar fascia.  She has been using ice and doing some stretching at home without much improvement.  She would like to avoid surgery if at all possible.  She has not done any rehab and is not currently taking any medications.  No similar prior episodes.  No reported trauma.  She has no infectious symptoms.  Her pain on both sides is worse with activity and better with rest. We agreed for her to continue stretching. I provided her with a few more options including using a towel around both feet prior to getting out of bed.  She also agreed to purchase some gel heel cups to wear in her shoes.  I will send her for formal physical therapy with instructions to develop and implement a home exercise program and also prescribed her meloxicam to begin taking daily.  She will then follow-up with me in about 6 weeks to determine how she is responding to this plan.    Update on 7/30/2024. Bess is a 28-year-old female coming back in for a follow-up visit here today for  her bilateral foot pain secondary to plantar fasciitis.  She states that she has improved marginally and overall thinks that she is only about 20% better compared to her initial visit.  She has been trying physical therapy, gel Inserts, and meloxicam without much improvement.        Objective    Right Ankle Exam   Swelling: none    Range of Motion   The patient has normal right ankle ROM.    Muscle Strength   The patient has normal right ankle strength.    Tests   Anterior drawer: negative  Varus tilt: negative    Other   Erythema: absent  Sensation: normal  Pulse: present     Comments:  No laxity with stability testing of the right ankle.  Strength is intact with dorsiflexion and plantarflexion.  Cabrera squeeze testing is normal.      Left Ankle Exam   Swelling: none    Muscle Strength   The patient has normal left ankle strength.    Tests   Anterior drawer: negative  Varus tilt: negative    Other   Erythema: absent  Sensation: normal  Pulse: present    Comments:  Patient has bilateral tenderness palpation over her plantar fascia areas mostly medially in both feet.            Image Results:  No new imaging today    Patient ID: Bess Mckeon is a 28 y.o. female.    Tendon Sheath Injection: bilateral plantar fascia on 7/30/2024 3:22 PM  Indications: pain  Details: 22 G needle, ultrasound-guided medial approach  Medications (Right): 40 mg triamcinolone acetonide 40 mg/mL; 1 mL lidocaine 10 mg/mL (1 %)  Medications (Left): 40 mg triamcinolone acetonide 40 mg/mL; 1 mL lidocaine 10 mg/mL (1 %)  Outcome: tolerated well, no immediate complications  Procedure, treatment alternatives, risks and benefits explained, specific risks discussed. Consent was given by the patient. Immediately prior to procedure a time out was called to verify the correct patient, procedure, equipment, support staff and site/side marked as required. Patient was prepped and draped in the usual sterile fashion.           Assessment/Plan   Encounter Diagnoses:  Bilateral plantar fasciitis    Plantar fasciitis of left foot    Heel pain, bilateral    Orders Placed This Encounter    Tendon Sheath Injection: bilateral plantar fascia    Point of Care Ultrasound     No follow-ups on file.      We discussed her treatment options and we agreed to  perform bilateral plantar fascia cortisone injections under ultrasound guidance. The patient tolerated the procedures well without any complications and activity modifications were reviewed.  She is going to continue the rest of her prior treatment plan and will follow-up with me as needed moving forward.  All questions answered.    ** Please excuse any errors in grammar or translation related to this dictation. Voice recognition software was utilized to prepare this document. **       Cj Bustamante MD  [facility]

## 2025-02-10 NOTE — PROGRESS NOTES
Infectious Diseases   Progress Note      Admission Date: 5/12/2019  Hospital Day: Hospital Day: 9  Attending: Cinthia Flores MD  Date of service: 5/20/2019    Presenting complaint:   Chief Complaint   Patient presents with    Contractions       Chief complaint/ Reason for consult: The patient was seen today for the following:    · Sepsis with high-grade fever and leukocytosis - gram-negative bacteremia suspected  · Postpartum fever  · Anemia    Microbiology:        I have reviewed all available micro lab data and cultures    · Blood culture (2/2) - collected on 5/16/19 and 5/17/19: Negative  · Urine culture  - collected on 5/16/2019: Negative        Problem list:       Patient Active Problem List   Diagnosis Code    Threatened labor O47.9    Threatened labor at term O50.10    Sepsis ([address] A41.9    Puerperal pyrexia of unknown origin, delivered, with mention of postpartum complication A78.2    Normal spontaneous vaginal delivery O80    Anemia D64.9    High fever R50.9         Assessment:     The patient is a 20 y.o. old female who  has a past medical history of Anemia. with following problems:    · Sepsis with high-grade fever and leukocytosis  - fever curve is coming down  · Fever of unknown origin - seems to be responding to linezolid  · Left lower quadrant tenderness - improving, and does have mild diarrhea today  · Postpartum fever - ongoing  · Anemia  · Status post normal vaginal delivery on 5/14/19 of a live baby boy  · Status post second-degree laceration repair on 5/14/19 - repair site has been healthy  · Immigrant from [place]        Discussion:      The patient is on IV linezolid on IV meropenem. I had started IV linezolid yesterday she was feeling despite being on IV meropenem    Perineal suture site was okay. However, he was deemed to be responding to addition of linezolid    Procalcitonin level was 0.06. WBC count is 11,000    Urine analysis reviewed.   Shows moderate leukocyte Patient stopped in and asked for a refill- Amlodipine 5mg 1 POQD    [facility] [address]    Can we please resend it for her?   Systems   Constitutional: Positive for fatigue. Negative for chills, diaphoresis and unexpected weight change. HENT: Negative for congestion, ear discharge, ear pain, facial swelling, hearing loss, rhinorrhea and trouble swallowing. Eyes: Negative for photophobia, discharge, redness and visual disturbance. Respiratory: Negative for apnea, cough, choking, chest tightness, shortness of breath and stridor. Cardiovascular: Negative for chest pain and palpitations. Gastrointestinal: Positive for diarrhea. Negative for abdominal pain, blood in stool and nausea. Endocrine: Negative for polydipsia, polyphagia and polyuria. Genitourinary: Negative for difficulty urinating, dysuria, frequency, hematuria, menstrual problem and vaginal discharge. Musculoskeletal: Negative for arthralgias, joint swelling, myalgias and neck stiffness. Skin: Negative for color change and rash. Allergic/Immunologic: Negative for immunocompromised state. Neurological: Negative for dizziness, seizures, speech difficulty, light-headedness and headaches. Hematological: Negative for adenopathy. Psychiatric/Behavioral: Negative for agitation, hallucinations and suicidal ideas. Past Medical History: All past medical history reviewed today. Past Medical History:   Diagnosis Date    Anemia     taking iron       Past Surgical History: All past surgical history was reviewed today. History reviewed. No pertinent surgical history. Immunization History: All immunization history was reviewed by me today. There is no immunization history for the selected administration types on file for this patient. Family History: All family history was reviewed today. History reviewed. No pertinent family history.     Objective:       PHYSICAL EXAM:      Vitals:   Vitals:    05/19/19 2015 05/20/19 0016 05/20/19 0415 05/20/19 0820   BP: 121/67  106/67    Pulse: 76  70    Resp: 16  16    Temp: 98.6 °F (37 °C) 99.8 °F (37.7 --  8.6   GLUCOSE  --  113*        Hepatic Function Panel:   Lab Results   Component Value Date    ALKPHOS 123 05/20/2019    ALT 14 05/20/2019    AST 18 05/20/2019    PROT 6.3 05/20/2019    BILITOT <0.2 05/20/2019    LABALBU 2.9 05/20/2019       CPK: No results found for: CKTOTAL  ESR: No results found for: SEDRATE  CRP: No results found for: CRP        Imaging: All pertinent images and reports for the current visit were reviewed by me during this visit. CT CHEST PULMONARY EMBOLISM W CONTRAST   Final Result   No evidence of pulmonary embolism or acute pulmonary abnormality. CT ABDOMEN PELVIS W IV CONTRAST Additional Contrast? None   Final Result   Uterus is enlarged and heterogeneous. Heterogeneous fluid is seen in the   endometrial canal.  Findings are most likely due to the recent postpartum   state. No obvious gas in the endometrial canal      No hydronephrosis noted. No significant bladder wall thickening. 1 mm density right kidney either artifact or tiny stone             Medications: All current and past medications were reviewed.      linezolid  600 mg Intravenous Q12H    lactobacillus  1 capsule Oral BID WC    sodium chloride flush  10 mL Intravenous 2 times per day    rho(D) immune globulin  300 mcg Intramuscular Once    docusate sodium  100 mg Oral BID    ferrous sulfate  325 mg Oral Daily    measles, mumps and rubella vaccine  0.5 mL Subcutaneous Prior to discharge    Tdap-Dtap  0.5 mL Intramuscular Prior to discharge        lactated ringers 100 mL/hr at 05/20/19 0359    oxytocin Stopped (05/14/19 0600)       sodium chloride flush, acetaminophen, ibuprofen, simethicone, magnesium hydroxide, sennosides-docusate sodium, ondansetron, ondansetron, famotidine, lanolin, witch hazel-glycerin, hydrocortisone, benzocaine-menthol, misoprostol, oxyCODONE    Current antibiotics: All antibiotics and their doses were reviewed by me today    Recent Abx Admin                   meropenem (MERREM) 1 g in sodium chloride 0.9 % 100 mL IVPB (mini-bag) (g) 1 g New Bag 05/20/19 0957     1 g New Bag  0138     1 g New Bag 05/19/19 1757    linezolid (ZYVOX) IVPB 600 mg (mg) 600 mg New Bag 05/20/19 0030     600 mg New Bag 05/19/19 1333                Known drug Allergies: All allergies were reviewed and updated    No Known Allergies        Please note that this chart was generated using Dragon dictation software. Although every effort was made to ensure the accuracy of this automated transcription, some errors in transcription may have occurred inadvertently. If you may need any clarification, please do not hesitate to contact me through EPIC or at the phone number provided below with my electronic signature.     Stacey Paez MD, MPH  5/20/2019, 11:14 AM  [facility] Infectious Disease   Office: [phone]  Fax: [phone]  Tuesday AM clinic:   [address] [place] [address], [facility] [address]  Thursday AM [id number] [facility]

## 2025-07-24 ENCOUNTER — APPOINTMENT (OUTPATIENT)
Dept: ORTHOPEDIC SURGERY | Facility: CLINIC | Age: 30
End: 2025-07-24
Payer: MEDICAID

## 2025-07-24 VITALS — BODY MASS INDEX: 24.75 KG/M2 | WEIGHT: 154 LBS | HEIGHT: 66 IN

## 2025-07-24 DIAGNOSIS — M72.2 PLANTAR FASCIITIS: Primary | ICD-10-CM

## 2025-07-24 PROCEDURE — 99214 OFFICE O/P EST MOD 30 MIN: CPT | Performed by: SPECIALIST

## 2025-07-24 PROCEDURE — 3008F BODY MASS INDEX DOCD: CPT | Performed by: SPECIALIST

## 2025-07-24 ASSESSMENT — PAIN SCALES - GENERAL: PAINLEVEL_OUTOF10: 4

## 2025-07-24 ASSESSMENT — PAIN - FUNCTIONAL ASSESSMENT: PAIN_FUNCTIONAL_ASSESSMENT: 0-10

## 2025-07-25 NOTE — PROGRESS NOTES
Patient is presenting with SO foot pain. Patient has received injections from Dr Bustamante with improvement, but last injection only lasted a couple of weeks. Patient denies injury and accident. Pain increases with walking. Last injection was done 7/30/2024. Last Xray was done 5/23/2024.  Her pain is started up in nature and no history of trauma.    Exam: Patient alert and oriented x 3 appears healthy.  In stance has minimal pes planus no erythema no ecchymosis.  She has bilateral tight gastrocs bilateral pain at the origin of the plantar fascia to palpation.  No pain of compression of tarsal tunnel or calcaneus.  Painless passive motion of the ankle subtalar and MTP regions.  Neurovascular intact.    Radiographs show no acute changes.  Small calcaneal osteophytes.    Assessment plan: Bilateral plantar fasciitis.  We fitted her for a night splint and instructed her on her stage I treatment program for her condition.  She will follow-up if no improvement in 2 months.

## 2025-07-30 ENCOUNTER — APPOINTMENT (OUTPATIENT)
Dept: OBSTETRICS AND GYNECOLOGY | Facility: CLINIC | Age: 30
End: 2025-07-30
Payer: MEDICAID

## 2025-08-12 ENCOUNTER — APPOINTMENT (OUTPATIENT)
Dept: PRIMARY CARE | Facility: CLINIC | Age: 30
End: 2025-08-12
Payer: MEDICAID

## 2026-01-14 ENCOUNTER — APPOINTMENT (OUTPATIENT)
Dept: PRIMARY CARE | Facility: CLINIC | Age: 31
End: 2026-01-14
Payer: MEDICAID